# Patient Record
Sex: FEMALE | Race: WHITE | Employment: UNEMPLOYED | URBAN - METROPOLITAN AREA
[De-identification: names, ages, dates, MRNs, and addresses within clinical notes are randomized per-mention and may not be internally consistent; named-entity substitution may affect disease eponyms.]

---

## 2017-10-28 ENCOUNTER — HOSPITAL ENCOUNTER (INPATIENT)
Age: 37
LOS: 9 days | Discharge: HOME | DRG: 885 | End: 2017-11-06
Attending: PSYCHIATRY & NEUROLOGY | Admitting: PSYCHIATRY & NEUROLOGY
Payer: MEDICARE

## 2017-10-28 PROBLEM — F25.0 SCHIZOAFFECTIVE DISORDER, BIPOLAR TYPE (HCC): Status: ACTIVE | Noted: 2017-10-28

## 2017-10-28 PROBLEM — F25.9 SCHIZOAFFECTIVE DISORDER (HCC): Status: ACTIVE | Noted: 2017-10-28

## 2017-10-28 PROBLEM — F17.210 DEPENDENCE ON NICOTINE FROM CIGARETTES: Status: ACTIVE | Noted: 2017-10-28

## 2017-10-28 PROBLEM — F10.10 ALCOHOL ABUSE: Status: ACTIVE | Noted: 2017-10-28

## 2017-10-28 PROCEDURE — 74011250637 HC RX REV CODE- 250/637: Performed by: PSYCHIATRY & NEUROLOGY

## 2017-10-28 PROCEDURE — 65220000001 HC RM PRIVATE PSYCH

## 2017-10-28 RX ORDER — LORAZEPAM 1 MG/1
1 TABLET ORAL
Status: DISCONTINUED | OUTPATIENT
Start: 2017-10-28 | End: 2017-11-06 | Stop reason: HOSPADM

## 2017-10-28 RX ORDER — ACETAMINOPHEN 325 MG/1
650 TABLET ORAL
Status: DISCONTINUED | OUTPATIENT
Start: 2017-10-28 | End: 2017-11-06 | Stop reason: HOSPADM

## 2017-10-28 RX ORDER — DIVALPROEX SODIUM 500 MG/1
1000 TABLET, DELAYED RELEASE ORAL
Status: ON HOLD | COMMUNITY
End: 2017-10-30

## 2017-10-28 RX ORDER — HYDROCHLOROTHIAZIDE 25 MG/1
25 TABLET ORAL DAILY
Status: DISCONTINUED | OUTPATIENT
Start: 2017-10-29 | End: 2017-10-28

## 2017-10-28 RX ORDER — BENZTROPINE MESYLATE 2 MG/1
2 TABLET ORAL
Status: DISCONTINUED | OUTPATIENT
Start: 2017-10-28 | End: 2017-11-06 | Stop reason: HOSPADM

## 2017-10-28 RX ORDER — HALOPERIDOL 5 MG/ML
5 INJECTION INTRAMUSCULAR
Status: DISCONTINUED | OUTPATIENT
Start: 2017-10-28 | End: 2017-11-06 | Stop reason: HOSPADM

## 2017-10-28 RX ORDER — IBUPROFEN 200 MG
1 TABLET ORAL
Status: DISCONTINUED | OUTPATIENT
Start: 2017-10-28 | End: 2017-11-06 | Stop reason: HOSPADM

## 2017-10-28 RX ORDER — ZOLPIDEM TARTRATE 10 MG/1
10 TABLET ORAL
Status: DISCONTINUED | OUTPATIENT
Start: 2017-10-28 | End: 2017-11-06 | Stop reason: HOSPADM

## 2017-10-28 RX ORDER — HALOPERIDOL 5 MG/1
5 TABLET ORAL
Status: DISCONTINUED | OUTPATIENT
Start: 2017-10-28 | End: 2017-11-06 | Stop reason: HOSPADM

## 2017-10-28 RX ORDER — ADHESIVE BANDAGE
30 BANDAGE TOPICAL DAILY PRN
Status: DISCONTINUED | OUTPATIENT
Start: 2017-10-28 | End: 2017-11-06 | Stop reason: HOSPADM

## 2017-10-28 RX ORDER — BENZTROPINE MESYLATE 1 MG/1
0.5 TABLET ORAL DAILY
Status: DISCONTINUED | OUTPATIENT
Start: 2017-10-29 | End: 2017-11-06 | Stop reason: HOSPADM

## 2017-10-28 RX ORDER — HYDROCHLOROTHIAZIDE 25 MG/1
25 TABLET ORAL DAILY
COMMUNITY
End: 2017-11-06

## 2017-10-28 RX ORDER — BENZTROPINE MESYLATE 0.5 MG/1
0.5 TABLET ORAL 2 TIMES DAILY
COMMUNITY
End: 2017-11-06

## 2017-10-28 RX ORDER — LITHIUM CARBONATE 300 MG/1
300 TABLET, FILM COATED, EXTENDED RELEASE ORAL 2 TIMES DAILY
Status: DISCONTINUED | OUTPATIENT
Start: 2017-10-28 | End: 2017-11-01

## 2017-10-28 RX ORDER — BENZTROPINE MESYLATE 1 MG/ML
2 INJECTION INTRAMUSCULAR; INTRAVENOUS
Status: DISCONTINUED | OUTPATIENT
Start: 2017-10-28 | End: 2017-11-06 | Stop reason: HOSPADM

## 2017-10-28 RX ORDER — IBUPROFEN 400 MG/1
400 TABLET ORAL
Status: DISCONTINUED | OUTPATIENT
Start: 2017-10-28 | End: 2017-10-30

## 2017-10-28 RX ORDER — ALBUTEROL SULFATE 90 UG/1
AEROSOL, METERED RESPIRATORY (INHALATION)
Status: ON HOLD | COMMUNITY
End: 2017-10-30

## 2017-10-28 RX ORDER — OLANZAPINE 5 MG/1
5 TABLET ORAL
Status: DISCONTINUED | OUTPATIENT
Start: 2017-10-28 | End: 2017-10-28

## 2017-10-28 RX ORDER — LORAZEPAM 2 MG/ML
2 INJECTION INTRAMUSCULAR
Status: DISCONTINUED | OUTPATIENT
Start: 2017-10-28 | End: 2017-11-06 | Stop reason: HOSPADM

## 2017-10-28 RX ORDER — ALBUTEROL SULFATE 90 UG/1
2 AEROSOL, METERED RESPIRATORY (INHALATION)
Status: DISCONTINUED | OUTPATIENT
Start: 2017-10-28 | End: 2017-10-31

## 2017-10-28 RX ORDER — BISMUTH SUBSALICYLATE 262 MG
1 TABLET,CHEWABLE ORAL DAILY
Status: ON HOLD | COMMUNITY
End: 2017-10-30

## 2017-10-28 RX ADMIN — LITHIUM CARBONATE 300 MG: 300 TABLET, FILM COATED, EXTENDED RELEASE ORAL at 12:28

## 2017-10-28 RX ADMIN — ALBUTEROL SULFATE 2 PUFF: 90 AEROSOL, METERED RESPIRATORY (INHALATION) at 09:34

## 2017-10-28 RX ADMIN — LITHIUM CARBONATE 300 MG: 300 TABLET, FILM COATED, EXTENDED RELEASE ORAL at 21:50

## 2017-10-28 NOTE — BH NOTES
Behavior  The patient Catherine Gonsalez is alert and oriented.  Her hygiene and nutritional intake has remain adequate.  Her  behavior is appropriate in the milieu with peers and staff, but presents Anxious . Patient contracts for safety.  Pt medication compliant. Per medication nurse. Patient exhibiting A wide arrange affect. Mood, hyper with Flight of ideals.         Intervention  1:1 interaction to assess mood and thought process. Staff offering assistance as needed.     Response  Pt denies S/H ideations. Pt denies hearing voices At this  times. Pt attending groups. Plan  Plan is to assess mood and thought process Staff encouraging verbalization of issues and feelings in the Appropriate manner Staff will monitor for changes.  Q 15 minute checks continue.

## 2017-10-28 NOTE — BH NOTES
ADMISSION NOTE:    Pt is a 41 yo female here on temporary intermediate order due to manic presentation, drunkeness, destructive behavior, substance abuse impulsive, agitated. PT is on probation for  drunkeness in public. Pt spent last night in [facility]. BAL and UDS negative. She has h/o HTN, asthma. PT stated \"I was receiving help from [id number] and they sent me here for medication adjustment. \"  She denied SI/HI and a/v hallucinations. Her skin assessment showed tattoo on the right and left shoulders and the right forearm. [facility] Linda Go

## 2017-10-28 NOTE — H&P
INITIAL PSYCHIATRIC EVALUATION            IDENTIFICATION:    Patient Name  Ericka Candelaria   Date of Birth 1980   [facility] [id number]   Medical Record Number  [id number]      Age  40 y.o. PCP Arturo Verdin MD   Admit date:  10/28/2017    Room Number  022/59  @ [facility]   Date of Service  10/28/2017            HISTORY         REASON FOR HOSPITALIZATION:  CC: \"feeling okay, I painted my room with Ronaldo name\". Pt admitted under a temporary residential order (TDO) for severe psychosis and rafaela proving to be an imminent danger to self and others and an inability to care for self. HISTORY OF PRESENT ILLNESS:    The patient, Ericka Candelaria, is a 40 y.o. WHITE [place]  female with a past psychiatric history significant for schizoaffective disorder, who presents at this time with complaints of (and/or evidence of) the following emotional symptoms: psychotic behavior. Additional symptomatology include impulsive, bizarre behavior ( tore up her room, painted her room with Ronaldo and red white and blue color). She is also reported to have been responding to internal stimuli- \"yelling at god\", alcohol abuse  and anger outbursts. On admission, pt is calm but appear guarded, paranoid delusional themes and with flat affect. Denies SI/HI. The above symptoms have been present for 1 week. These symptoms are of severe severity. These symptoms are constant in nature. The patient's condition has been precipitated by and psychosocial stressors (alcohol abuse, non compliance ). Patient's condition made worse by  alcohol use as well as treatment noncompliance. UDS: negative; BAL=0. ALLERGIES: No Known Allergies   MEDICATIONS PRIOR TO ADMISSION:   Prescriptions Prior to Admission   Medication Sig    ARIPiprazole (ABILIFY) 9.75 mg/1.3 mL injection by IntraMUSCular route daily.     white petrolatum-mineral oil (REFRESH LACRI-LUBE) 56.8-42.5 % ointment Administer  to both eyes every twelve (12) hours.  HALOPERIDOL DECANOATE IM by IntraMUSCular route.  HYPROMELLOSE (TEARISOL OP) Apply  to eye.  hydroCHLOROthiazide (HYDRODIURIL) 25 mg tablet Take 25 mg by mouth daily.  divalproex DR (DEPAKOTE) 500 mg tablet Take 1,000 mg by mouth nightly.  multivitamin (ONE A DAY) tablet Take 1 Tab by mouth daily.  albuterol (PROVENTIL HFA, VENTOLIN HFA, PROAIR HFA) 90 mcg/actuation inhaler Take  by inhalation.  benztropine (COGENTIN) 0.5 mg tablet Take 0.5 mg by mouth daily. PAST MEDICAL HISTORY:   Past Medical History:   Diagnosis Date    Hypertension    No past surgical history on file. SOCIAL HISTORY:    Social History     Social History    Marital status: SINGLE     Spouse name: N/A    Number of children: N/A    Years of education: N/A     Occupational History    Not on file. Social History Main Topics    Smoking status: Current Every Day Smoker    Smokeless tobacco: Never Used    Alcohol use Yes      Comment: binge drinking- has sig use in the past    Drug use: Yes     Special: Marijuana    Sexual activity: Yes     Other Topics Concern    Not on file     Social History Narrative    Broke up with her BF of 2 years. Single. Lives with her stepfather. Drunken in public offense-    No sexual abuse          FAMILY HISTORY: History reviewed. No pertinent family history. No family history on file. REVIEW OF SYSTEMS:   Psychological ROS: positive for - behavioral disorder, hallucinations, irritability and delusion  Pertinent items are noted in the History of Present Illness. All other Systems reviewed and are considered negative.            MENTAL STATUS EXAM & VITALS     MENTAL STATUS EXAM (MSE):    MSE FINDINGS ARE WITHIN NORMAL LIMITS (WNL) UNLESS OTHERWISE STATED BELOW. ( ALL OF THE BELOW CATEGORIES OF THE MSE HAVE BEEN REVIEWED (reviewed 10/28/2017) AND UPDATED AS DEEMED APPROPRIATE )  General Presentation age appropriate and casually dressed, guarded and unreliable   Orientation disorganized   Vital Signs  See below (reviewed 10/28/2017); Vital Signs (BP, Pulse, & Temp) are within normal limits if not listed below. Gait and Station Stable/steady, no ataxia   Musculoskeletal System No extrapyramidal symptoms (EPS); no abnormal muscular movements or Tardive Dyskinesia (TD); muscle strength and tone are within normal limits   Language No aphasia or dysarthria   Speech:  hypoverbal and monotone   Thought Processes illogical; slow rate of thoughts; poor abstract reasoning/computation   Thought Associations tangential   Thought Content paranoid delusions, bizarre delusions, auditory hallucinations and internally preoccupied   Suicidal Ideations none and contracts for safety   Homicidal Ideations none   Mood:  anxious  and anhedonia   Affect:  anxious, inappropriate, increased in intensity and mood-incongruent   Memory recent  impaired   Memory remote:  intact   Concentration/Attention:  distractable and hypervigilance   Fund of Knowledge average   Insight:  poor   Reliability poor   Judgment:  limited          VITALS:     Patient Vitals for the past 24 hrs:   Temp Pulse Resp BP SpO2   10/28/17 1147 97 °F (36.1 °C) - 16 133/87 -   10/28/17 0545 95.1 °F (35.1 °C) 77 18 115/82 96 %     Wt Readings from Last 3 Encounters:   10/28/17 125.2 kg (276 lb)     Temp Readings from Last 3 Encounters:   10/28/17 97 °F (36.1 °C)     BP Readings from Last 3 Encounters:   10/28/17 133/87     Pulse Readings from Last 3 Encounters:   10/28/17 77            DATA     LABORATORY DATA:  Labs Reviewed - No data to display  No results found for any previous visit. RADIOLOGY REPORTS:  No results found for this or any previous visit. No results found.            MEDICATIONS       ALL MEDICATIONS  Current Facility-Administered Medications   Medication Dose Route Frequency    benztropine (COGENTIN) tablet 2 mg  2 mg Oral BID PRN    benztropine (COGENTIN) injection 2 mg  2 mg IntraMUSCular BID PRN    LORazepam (ATIVAN) injection 2 mg  2 mg IntraMUSCular Q4H PRN    LORazepam (ATIVAN) tablet 1 mg  1 mg Oral Q4H PRN    zolpidem (AMBIEN) tablet 10 mg  10 mg Oral QHS PRN    acetaminophen (TYLENOL) tablet 650 mg  650 mg Oral Q4H PRN    ibuprofen (MOTRIN) tablet 400 mg  400 mg Oral Q8H PRN    magnesium hydroxide (MILK OF MAGNESIA) 400 mg/5 mL oral suspension 30 mL  30 mL Oral DAILY PRN    nicotine (NICODERM CQ) 21 mg/24 hr patch 1 Patch  1 Patch TransDERmal DAILY PRN    albuterol (PROVENTIL HFA, VENTOLIN HFA, PROAIR HFA) inhaler 2 Puff  2 Puff Inhalation Q4H RT    [START ON 10/29/2017] benztropine (COGENTIN) tablet 0.5 mg  0.5 mg Oral DAILY    haloperidol (HALDOL) tablet 5 mg  5 mg Oral Q6H PRN    haloperidol lactate (HALDOL) injection 5 mg  5 mg IntraMUSCular Q6H PRN    lithium carbonate SR (LITHOBID) tablet 300 mg  300 mg Oral BID      SCHEDULED MEDICATIONS  Current Facility-Administered Medications   Medication Dose Route Frequency    albuterol (PROVENTIL HFA, VENTOLIN HFA, PROAIR HFA) inhaler 2 Puff  2 Puff Inhalation Q4H RT    [START ON 10/29/2017] benztropine (COGENTIN) tablet 0.5 mg  0.5 mg Oral DAILY    lithium carbonate SR (LITHOBID) tablet 300 mg  300 mg Oral BID                ASSESSMENT & PLAN        The patient, Catherine Gonsalez, is a 40 y.o.  female who presents at this time for treatment of the following diagnoses:  Patient Active Hospital Problem List:   Schizoaffective disorder, bipolar type ([facility] [address] (10/28/2017)    Assessment: acute on chronic- psychosis with agitation- calm on admission- f/u with local [facility]- states she received Haldol dec yesterday,? Abilify depot and not taking her Depakote    Plan: I will ct to adjust med- need collateral info- ct with Haldol Dec and add PO, switch Depakote to Lithium   Alcohol abuse (10/28/2017)    Assessment: episodic per report, denies w/d sx    Plan: ct to monitor for w/d- BAL-ve   Dependence on nicotine from cigarettes (10/28/2017) Assessment: chronic    Plan: nicotine patch          I will continue to monitor blood levels (Depakote, lithium, -a drug with a narrow therapeutic index= NTI) and associated labs for drug therapy implemented that require intense monitoring for toxicity as deemed appropriate based on current medication side effects and pharmacodynamically determined drug 1/2 lives. A coordinated, multidisplinary treatment team (includes the nurse, unit pharmcist,  and writer) round was conducted for this initial evaluation with the patient present. The following regarding medications was addressed during rounds with patient:   the risks and benefits of the proposed medication. The patient was given the opportunity to ask questions. Informed consent given to the use of the above medications. I will continue to adjust psychiatric and non-psychiatric medications (see above \"medication\" section and orders section for details) as deemed appropriate & based upon diagnoses and response to treatment. I have reviewed admission (and previous/old) labs and medical tests in the EHR and or transferring hospital documents. I will continue to order blood tests/labs and diagnostic tests as deemed appropriate and review results as they become available (see orders for details). I have reviewed old psychiatric and medical records available in the EHR. I Will order additional psychiatric records from other institutions to further elucidate the nature of patient's psychopathology and review once available. I will gather additional collateral information from friends, family and o/p treatment team to further elucidate the nature of patient's psychopathology and baselline level of psychiatric functioning.       ESTIMATED LENGTH OF STAY:    TBD       STRENGTHS:  Access to housing/residential stability and Interpersonal/supportive relationships (family, friends, peers)                                        SIGNED: Adin Ho MD  10/28/2017

## 2017-10-28 NOTE — IP AVS SNAPSHOT
[facility] 
 
 
 [address] Streeter Patient: Estela Ortiz MRN: [id number] OHM:[id number] About your hospitalization You were admitted on:  October 28, 2017 You last received care in the:  [facility]. [facility] You were discharged on:  November 6, 2017 Why you were hospitalized Your primary diagnosis was:  Schizoaffective Disorder, Bipolar Type (Hcc) Your diagnoses also included:  Alcohol Abuse, Dependence On Nicotine From Cigarettes Things You Need To Do (next 8 weeks) Follow up with Rmaonita Lewis MD  
  
Phone:  [phone] Where:  [facility] Tuesday Nov 14, 2017 Go to [facility]  
psychiatric medication management at 8:45 AM  
  
Where:  Address: [facility], [address] [place] [address] Phone: [phone] Friday Nov 24, 2017 Follow up with Haloperidol decanoate 150 mg IM injection Haloperidol decanoate 150 mg IM injection due on 11/24/17. Please discontinue Abilify Maintena injection. Where:  [facility] Board Discharge Orders None A check [place] indicates which time of day the medication should be taken. My Medications STOP taking these medications ABILIFY MAINTENA 400 mg injection Generic drug:  ARIPiprazole  
   
  
 hydroCHLOROthiazide 25 mg tablet Commonly known as:  HYDRODIURIL  
   
  
 lisinopril 20 mg tablet Commonly known as:  PRINIVIL, ZESTRIL  
   
  
  
TAKE these medications as instructed Instructions Each Dose to Equal  
 Morning Noon Evening Bedtime  
 atenolol 25 mg tablet Commonly known as:  TENORMIN Start taking on:  11/7/2017 Your last dose was: Your next dose is: Take 1 Tab by mouth daily for 14 days. Indications: hypertension 25 mg  
    
   
   
   
  
 benztropine 0.5 mg tablet Commonly known as:  COGENTIN Start taking on:  11/7/2017 Your last dose was: Your next dose is: Take 1 Tab by mouth daily for 14 days. Indications: extrapyramidal disease 0.5 mg  
    
   
   
   
  
 haloperidol 5 mg tablet Commonly known as:  HALDOL Your last dose was: Your next dose is: Take 1 Tab by mouth two (2) times a day for 14 days. Indications: Schizoaffective disorder 5 mg  
    
   
   
   
  
 haloperidol decanoate 100 mg/mL injection Commonly known as:  HALDOL DECANOATE Start taking on:  11/24/2017 Your last dose was: Your next dose is:    
   
   
 1.5 mL by IntraMUSCular route every twenty-eight (28) days for 28 days. Indications: Bipolar disorder 150 mg  
    
   
   
   
  
 * lithium carbonate  mg CR tablet Commonly known as:  Marta Killings Your last dose was: Your next dose is: Take 2 Tabs by mouth nightly for 14 days. Indications: Schizoaffective disorder, bipolar type 600 mg  
    
   
   
   
  
 * lithium carbonate  mg CR tablet Commonly known as:  Marta Killings Start taking on:  11/7/2017 Your last dose was: Your next dose is: Take 1 Tab by mouth daily for 14 days. Indications: Schizoaffective disorder, bipolar type 300 mg * Notice: This list has 2 medication(s) that are the same as other medications prescribed for you. Read the directions carefully, and ask your doctor or other care provider to review them with you. Where to Get Your Medications Information on where to get these meds will be given to you by the nurse or doctor. ! Ask your nurse or doctor about these medications  
  atenolol 25 mg tablet  
 benztropine 0.5 mg tablet  
 haloperidol 5 mg tablet  
 haloperidol decanoate 100 mg/mL injection  
 lithium carbonate  mg CR tablet  
 lithium carbonate  mg CR tablet Discharge Instructions Maritza Tobias  [phone] [address] [address] [phone] Arsenio Preston  [phone] "[employer]" 32-   [phone] [facility]-  [phone] [address] [facility]  [phone] [address] [facility]  [phone] [employer] Announcement We are excited to announce that we are making your provider's discharge notes available to you in [employer]. You will see these notes when they are completed and signed by the physician that discharged you from your recent hospital stay. If you have any questions or concerns about any information you see in [employer], please call the Health Information Department where you were seen or reach out to your Primary Care Provider for more information about your plan of care. Introducing [facility]! [address] [facility] introduces [employer] patient portal. Now you can access parts of your medical record, email your doctor's office, and request medication refills online. 1. In your internet browser, go to [email or web address] 2. Click on the First Time User? Click Here link in the Sign In box. You will see the New Member Sign Up page. 3. Enter your [employer] Access Code exactly as it appears below. You will not need to use this code after youve completed the sign-up process. If you do not sign up before the expiration date, you must request a new code. · [employer] Access Code: Melissa Kothari Expires: 2/4/2018 11:16 AM 
 
4. Enter the last four digits of your Social Security Number (xxxx) and Date of Birth (mm/dd/yyyy) as indicated and click Submit. You will be taken to the next sign-up page. 5. Create a Secret Labt ID. This will be your [employer] login ID and cannot be changed, so think of one that is secure and easy to remember. 6. Create a Secret Labt password. You can change your password at any time. 7. Enter your Password Reset Question and Answer. This can be used at a later time if you forget your password. 8. Enter your e-mail address. You will receive e-mail notification when new information is available in [address] 9. Click Sign Up. You can now view and download portions of your medical record. 10. Click the Download Summary menu link to download a portable copy of your medical information. If you have questions, please visit the Frequently Asked Questions section of the [employer] website. Remember, [employer] is NOT to be used for urgent needs. For medical emergencies, dial 911. Now available from your iPhone and Android! Providers Seen During Your Hospitalization Provider Specialty Primary office phone Eric Singh MD Psychiatry [phone] Your Primary Care Physician (PCP) Primary Care Physician Office Phone Office Fax [place] [phone] You are allergic to the following No active allergies Recent Documentation Height Weight Breastfeeding? BMI Smoking Status 1.651 m 125.2 kg No 45.93 kg/m2 Current Every Day Smoker Emergency Contacts Name Discharge Info Relation Home Work Mobile Jorge A Hauser CAREGIVER [3] Friend [5] [phone] Teodoro Amarilys CAREGIVER [3] Friend [5] [phone] Patient Belongings The following personal items are in your possession at time of discharge: 
  Dental Appliances: None  Visual Aid: None      Home Medications: None   Jewelry: Earrings  Clothing: Footwear, Pants, Shirt    Other Valuables: Money (comment) (One Dollar)  Personal Items Sent to Safe: One dollar Please provide this summary of care documentation to your next provider. Signatures-by signing, you are acknowledging that this After Visit Summary has been reviewed with you and you have received a copy. Patient Signature:  ____________________________________________________________ Date:  ____________________________________________________________  
  
Orbie Green Provider Signature:  ____________________________________________________________ Date:  ____________________________________________________________

## 2017-10-28 NOTE — BH NOTES
GROUP THERAPY PROGRESS NOTE    The patient Catherine Gonsalez is participating in [employer]. Group time: 30 minutes    Personal goal for participation: to orient the patient to the unit.     Goal orientation: successful adoption of unit rules    Group therapy participation: minimal    Therapeutic interventions reviewed and discussed: Yes    Impression of participation: fiona Thornton  10/28/2017 2:06 PM

## 2017-10-29 PROCEDURE — 65220000001 HC RM PRIVATE PSYCH

## 2017-10-29 PROCEDURE — 74011250637 HC RX REV CODE- 250/637: Performed by: PSYCHIATRY & NEUROLOGY

## 2017-10-29 RX ORDER — HALOPERIDOL 5 MG/1
5 TABLET ORAL 2 TIMES DAILY
Status: DISCONTINUED | OUTPATIENT
Start: 2017-10-29 | End: 2017-11-06 | Stop reason: HOSPADM

## 2017-10-29 RX ORDER — ATENOLOL 25 MG/1
25 TABLET ORAL DAILY
Status: DISCONTINUED | OUTPATIENT
Start: 2017-10-30 | End: 2017-11-06 | Stop reason: HOSPADM

## 2017-10-29 RX ADMIN — LITHIUM CARBONATE 300 MG: 300 TABLET, FILM COATED, EXTENDED RELEASE ORAL at 08:10

## 2017-10-29 RX ADMIN — ALBUTEROL SULFATE 2 PUFF: 90 AEROSOL, METERED RESPIRATORY (INHALATION) at 08:09

## 2017-10-29 RX ADMIN — BENZTROPINE MESYLATE 0.5 MG: 1 TABLET ORAL at 08:11

## 2017-10-29 RX ADMIN — ALBUTEROL SULFATE 2 PUFF: 90 AEROSOL, METERED RESPIRATORY (INHALATION) at 12:01

## 2017-10-29 NOTE — BH NOTES
PSYCHIATRIC PROGRESS NOTE         Patient Name  Josemanuel Silver   Date of Birth 1980   [facility] [id number]   Medical Record Number  [id number]      Age  40 y.o. PCP Arthur Elmore MD   Admit date:  10/28/2017    Room Number  451/50  @ [facility]   Date of Service  10/29/2017          PSYCHOTHERAPY SESSION NOTE:  Length of psychotherapy session: 15 minutes    Main condition/diagnosis/issues treated during session today, 10/29/2017 : psychosis, agitation, medication, tx compliance    I employed Cognitive Behavioral therapy techniques, Reality-Oriented psychotherapy, as well as supportive psychotherapy in regards to various ongoing psychosocial stressors, including the following: pre-admission and current problems; housing issues; occupational issues; academic issues; medical issues; and stress of hospitalization. Interpersonal relationship issues and psychodynamic conflicts explored. Attempts made to alleviate maladaptive patterns. We, also, worked on issues of denial & effects of substance dependency/use     Overall, patient is not progressing    Treatment Plan Update (reviewed an updated 10/29/2017) : I will modify psychotherapy tx plan by implementing more stress management strategies, building upon cognitive behavioral techniques, increasing coping skills, as well as shoring up psychological defenses). An extended energy and skill set was needed to engage pt in psychotherapy due to some of the following: resistiveness, complexity, negativity, confrontational nature, hostile behaviors, and/or severe abnormalities in thought processes/psychosis resulting in the loss of expressive/receptive language communication skills. E & M PROGRESS NOTE:         HISTORY       CC:  \"okay\"  HISTORY OF PRESENT ILLNESS/INTERVAL HISTORY:  (reviewed/updated 10/29/2017). per initial evaluation: The patient, Josemanuel Silver, is a 40 y.o.   WHITE [place]  female with a past psychiatric history significant for schizoaffective disorder, who presents at this time with complaints of (and/or evidence of) the following emotional symptoms: psychotic behavior. Additional symptomatology include impulsive, bizarre behavior ( tore up her room, painted her room with Ronaldo and red white and blue color). She is also reported to have been responding to internal stimuli- \"yelling at god\", alcohol abuse  and anger outbursts. On admission, pt is calm but appear guarded, paranoid delusional themes and with flat affect. Denies SI/HI. The above symptoms have been present for 1 week. These symptoms are of severe severity. These symptoms are constant in nature. The patient's condition has been precipitated by and psychosocial stressors (alcohol abuse, non compliance ). Patient's condition made worse by  alcohol use as well as treatment noncompliance. UDS: negative; BAL=0. [employer] presents/reports/evidences the following emotional symptoms today, 10/29/2017:psychotic behavior. The above symptoms have been present for 1 week. These symptoms are of severe severity. The symptoms are constant in nature. Additional symptomatology and features include labile mood, guarded with paranoid delusional themes. Refused labs. SIDE EFFECTS: (reviewed/updated 10/29/2017)  None reported or admitted to. No noted toxicity with use of current med   ALLERGIES:(reviewed/updated 10/29/2017)  No Known Allergies   MEDICATIONS PRIOR TO ADMISSION:(reviewed/updated 10/29/2017)  Prescriptions Prior to Admission   Medication Sig    ARIPiprazole (ABILIFY) 9.75 mg/1.3 mL injection by IntraMUSCular route daily.  white petrolatum-mineral oil (REFRESH LACRI-LUBE) 56.8-42.5 % ointment Administer  to both eyes every twelve (12) hours.  HALOPERIDOL DECANOATE IM by IntraMUSCular route.  HYPROMELLOSE (TEARISOL OP) Apply  to eye.  hydroCHLOROthiazide (HYDRODIURIL) 25 mg tablet Take 25 mg by mouth daily.  divalproex DR (DEPAKOTE) 500 mg tablet Take 1,000 mg by mouth nightly.  multivitamin (ONE A DAY) tablet Take 1 Tab by mouth daily.  albuterol (PROVENTIL HFA, VENTOLIN HFA, PROAIR HFA) 90 mcg/actuation inhaler Take  by inhalation every four (4) hours as needed.  benztropine (COGENTIN) 0.5 mg tablet Take 0.5 mg by mouth daily. PAST MEDICAL HISTORY: Past medical history from the initial psychiatric evaluation has been reviewed (reviewed/updated 10/29/2017) with no additional updates (I asked patient and no additional past medical history provided). Past Medical History:   Diagnosis Date    Hypertension    No past surgical history on file. SOCIAL HISTORY: Social history from the initial psychiatric evaluation has been reviewed (reviewed/updated 10/29/2017) with no additional updates (I asked patient and no additional social history provided). Social History     Social History    Marital status: SINGLE     Spouse name: N/A    Number of children: N/A    Years of education: N/A     Occupational History    Not on file. Social History Main Topics    Smoking status: Current Every Day Smoker    Smokeless tobacco: Never Used    Alcohol use Yes      Comment: binge drinking- has sig use in the past    Drug use: Yes     Special: Marijuana    Sexual activity: Yes     Other Topics Concern    Not on file     Social History Narrative    Broke up with her BF of 2 years. Single. Lives with her stepfather. Drunken in public offense-    No sexual abuse          FAMILY HISTORY: Family history from the initial psychiatric evaluation has been reviewed (reviewed/updated 10/29/2017) with no additional updates (I asked patient and no additional family history provided). No family history on file.     REVIEW OF SYSTEMS: (reviewed/updated 10/29/2017)  Appetite:good   Sleep: fitful   All other Review of Systems: Psychological ROS: positive for - behavioral disorder, concentration difficulties, irritability, mood swings and psychosis  Respiratory ROS: no cough, shortness of breath, or wheezing  Cardiovascular ROS: no chest pain or dyspnea on exertion  Neurological ROS: no TIA or stroke symptoms         MENTAL STATUS EXAM & VITALS     MENTAL STATUS EXAM (MSE):    MSE FINDINGS ARE WITHIN NORMAL LIMITS (WNL) UNLESS OTHERWISE STATED BELOW. ( ALL OF THE BELOW CATEGORIES OF THE MSE HAVE BEEN REVIEWED (reviewed 10/29/2017) AND UPDATED AS DEEMED APPROPRIATE )  General Presentation age appropriate and disheveled, guarded, passive and unreliable   Orientation oriented to time, place and person   Vital Signs  See below (reviewed 10/29/2017); Vital Signs (BP, Pulse, & Temp) are within normal limits if not listed below.    Gait and Station Stable/steady, no ataxia   Musculoskeletal System No extrapyramidal symptoms (EPS); no abnormal muscular movements or Tardive Dyskinesia (TD); muscle strength and tone are within normal limits   Language No aphasia or dysarthria   Speech:  monotone   Thought Processes illogical; slow rate of thoughts; poor abstract reasoning/computation   Thought Associations blocked    Thought Content paranoid delusions, free of hallucinations and internally preoccupied   Suicidal Ideations none   Homicidal Ideations none   Mood:  irritable and labile    Affect:  increased in intensity, irritable and labile   Memory recent  impaired   Memory remote:  intact   Concentration/Attention:  distractable   Fund of Knowledge average   Insight:  limited   Reliability poor   Judgment:  limited          VITALS:     Patient Vitals for the past 24 hrs:   Temp Pulse Resp BP   10/29/17 1600 96.5 °F (35.8 °C) (!) 105 18 (!) 129/98     Wt Readings from Last 3 Encounters:   10/28/17 125.2 kg (276 lb)     Temp Readings from Last 3 Encounters:   10/29/17 96.5 °F (35.8 °C)     BP Readings from Last 3 Encounters:   10/29/17 (!) 129/98     Pulse Readings from Last 3 Encounters:   10/29/17 (!) 105            DATA LABORATORY DATA:(reviewed/updated 10/29/2017)  No results found for this or any previous visit (from the past 24 hour(s)). No results found for: VALF2, VALAC, VALP, VALPR, DS6, CRBAM, CRBAMP, CARB2, XCRBAM  No results found for: LITHM   RADIOLOGY REPORTS:(reviewed/updated 10/29/2017)  No results found.        MEDICATIONS     ALL MEDICATIONS:   Current Facility-Administered Medications   Medication Dose Route Frequency    [START ON 10/30/2017] atenolol (TENORMIN) tablet 25 mg  25 mg Oral DAILY    haloperidol (HALDOL) tablet 5 mg  5 mg Oral BID    benztropine (COGENTIN) tablet 2 mg  2 mg Oral BID PRN    benztropine (COGENTIN) injection 2 mg  2 mg IntraMUSCular BID PRN    LORazepam (ATIVAN) injection 2 mg  2 mg IntraMUSCular Q4H PRN    LORazepam (ATIVAN) tablet 1 mg  1 mg Oral Q4H PRN    zolpidem (AMBIEN) tablet 10 mg  10 mg Oral QHS PRN    acetaminophen (TYLENOL) tablet 650 mg  650 mg Oral Q4H PRN    ibuprofen (MOTRIN) tablet 400 mg  400 mg Oral Q8H PRN    magnesium hydroxide (MILK OF MAGNESIA) 400 mg/5 mL oral suspension 30 mL  30 mL Oral DAILY PRN    nicotine (NICODERM CQ) 21 mg/24 hr patch 1 Patch  1 Patch TransDERmal DAILY PRN    albuterol (PROVENTIL HFA, VENTOLIN HFA, PROAIR HFA) inhaler 2 Puff  2 Puff Inhalation Q4H RT    benztropine (COGENTIN) tablet 0.5 mg  0.5 mg Oral DAILY    haloperidol (HALDOL) tablet 5 mg  5 mg Oral Q6H PRN    haloperidol lactate (HALDOL) injection 5 mg  5 mg IntraMUSCular Q6H PRN    lithium carbonate SR (LITHOBID) tablet 300 mg  300 mg Oral BID      SCHEDULED MEDICATIONS:   Current Facility-Administered Medications   Medication Dose Route Frequency    [START ON 10/30/2017] atenolol (TENORMIN) tablet 25 mg  25 mg Oral DAILY    haloperidol (HALDOL) tablet 5 mg  5 mg Oral BID    albuterol (PROVENTIL HFA, VENTOLIN HFA, PROAIR HFA) inhaler 2 Puff  2 Puff Inhalation Q4H RT    benztropine (COGENTIN) tablet 0.5 mg  0.5 mg Oral DAILY    lithium carbonate SR (LITHOBID) tablet 300 mg  300 mg Oral BID          ASSESSMENT & PLAN     DIAGNOSES REQUIRING ACTIVE TREATMENT AND MONITORING: (reviewed/updated 10/29/2017)  Patient Active Hospital Problem List:   Schizoaffective disorder, bipolar type ([facility] [address] (10/28/2017)    Assessment: acute on chronic- psychosis with agitation- calm on admission- f/u with local [facility]- states she received Haldol dec yesterday,? Abilify depot and not taking her Depakote- minimal change    Plan: I will ct to adjust med- need collateral info- ct with Haldol Dec and add PO, switch Depakote to Lithium   Alcohol abuse (10/28/2017)    Assessment: episodic per report, denies w/d sx    Plan: ct to monitor for w/d- BAL-ve   Dependence on nicotine from cigarettes (10/28/2017)    Assessment: chronic    Plan: nicotine patch      I will continue to monitor blood levels (Depakote, lithium--a drug with a narrow therapeutic index= NTI) and associated labs for drug therapy implemented that require intense monitoring for toxicity as deemed appropriate based on current medication side effects and pharmacodynamically determined drug 1/2 lives. In summary, Stacie Ernst, is a 40 y.o.  female who presents with a severe exacerbation of the principal diagnosis of Schizoaffective disorder, bipolar type ([facility]  Patient's condition is worsening/not improving/not stable . Patient requires continued inpatient hospitalization for further stabilization, safety monitoring and medication management. I will continue to coordinate the provision of individual, milieu, occupational, group, and substance abuse therapies to address target symptoms/diagnoses as deemed appropriate for the individual patient. A coordinated, multidisplinary treatment team round was conducted with the patient (this team consists of the nurse, psychiatric unit pharmcist,  and writer).      Complete current electronic health record for patient has been reviewed today including consultant notes, ancillary staff notes, nurses and psychiatric tech notes. Suicide risk assessment completed and patient deemed to be of low risk for suicide at this time. The following regarding medications was addressed during rounds with patient:   the risks and benefits of the proposed medication. The patient was given the opportunity to ask questions. Informed consent given to the use of the above medications. Will continue to adjust psychiatric and non-psychiatric medications (see above \"medication\" section and orders section for details) as deemed appropriate & based upon diagnoses and response to treatment. I will continue to order blood tests/labs and diagnostic tests as deemed appropriate and review results as they become available (see orders for details and above listed lab/test results). I will order psychiatric records from previous [facility] hospitals to further elucidate the nature of patient's psychopathology and review once available. I will gather additional collateral information from friends, family and o/p treatment team to further elucidate the nature of patient's psychopathology and baselline level of psychiatric functioning. I certify that this patient's inpatient psychiatric hospital services furnished since the previous certification were, and continue to be, required for treatment that could reasonably be expected to improve the patient's condition, or for diagnostic study, and that the patient continues to need, on a daily basis, active treatment furnished directly by or requiring the supervision of inpatient psychiatric facility personnel. In addition, the hospital records show that services furnished were intensive treatment services, admission or related services, or equivalent services.     EXPECTED DISCHARGE DATE/DAY: TBD     DISPOSITION: Home       Signed By:   Abimbola Cardenas MD  10/29/2017

## 2017-10-29 NOTE — BH NOTES
Alert and oriented and to all spheres. Pt.'s self care skills and grooming habits are fair; casually dressed. Pt.'s nutritional intake is adequate. During the shift this pt. have been mostly isolative to self in own room. Visible in the milieu to have needs met. Pt.'s insight and judgement regarding mental illness and judgement is limited. Presents as being guarded, anxious, and preoccupied. Affect/Mood: Blunt. Encourages verbalization of feelings. Offers support and anticipates needs. Q-15 minute checks maintained per Hospital protocol.

## 2017-10-29 NOTE — H&P
History and Physical    Subjective:     Erika Abreu is a 40 y.o. female with past medical hx significant for HTN, Asthma, and obesity. Pt is hospitalized with principal diagnosis of schizoaffective disorder. Pt is stable with regards to asthma not requiring any treatment. Pt is not copmplaining of any other acute medical issues. Pt is showing no signs of acute distress. Past Medical History:   Diagnosis Date    Hypertension      > Asthma   > Obesity     PSHx:non declared by pt. Fhx: cancer    Social History   Substance Use Topics    Smoking status: Current Every Day Smoker    Smokeless tobacco: Never Used    Alcohol use Yes      Comment: binge drinking- has sig use in the past       Prior to Admission medications    Medication Sig Start Date End Date Taking? Authorizing Provider   ARIPiprazole (ABILIFY) 9.75 mg/1.3 mL injection by IntraMUSCular route daily. Yes Historical Provider   white petrolatum-mineral oil (REFRESH LACRI-LUBE) 56.8-42.5 % ointment Administer  to both eyes every twelve (12) hours. Yes Historical Provider   HALOPERIDOL DECANOATE IM by IntraMUSCular route. Yes Historical Provider   HYPROMELLOSE (TEARISOL OP) Apply  to eye. Yes Historical Provider   hydroCHLOROthiazide (HYDRODIURIL) 25 mg tablet Take 25 mg by mouth daily. Yes Historical Provider   divalproex DR (DEPAKOTE) 500 mg tablet Take 1,000 mg by mouth nightly. Yes Historical Provider   multivitamin (ONE A DAY) tablet Take 1 Tab by mouth daily. Yes Historical Provider   albuterol (PROVENTIL HFA, VENTOLIN HFA, PROAIR HFA) 90 mcg/actuation inhaler Take  by inhalation. Yes Historical Provider   benztropine (COGENTIN) 0.5 mg tablet Take 0.5 mg by mouth daily.    Yes Historical Provider     No Known Allergies     Review of Systems:  Constitutional: negative  Eyes: negative  Ears, Nose, Mouth, Throat, and Face: negative  Respiratory: negative  Cardiovascular: negative  Gastrointestinal: negative  Genitourinary:negative  Integument/Breast: negative  Hematologic/Lymphatic: negative  Musculoskeletal:negative  Neurological: negative  Behavioral/Psychiatric: schizoaffective disorder  Endocrine: negative  Allergic/Immunologic: negative     Objective: Intake and Output:            Physical Exam:   Visit Vitals    /87 (BP 1 Location: Right arm, BP Patient Position: During activity)    Pulse 77    Temp 97 °F (36.1 °C)    Resp 16    Ht 5' 5\" (1.651 m)    Wt 125.2 kg (276 lb)    SpO2 96%    Breastfeeding No    BMI 45.93 kg/m2     General:  Alert, cooperative, no distress, appears stated age. Head:  Normocephalic, without obvious abnormality, atraumatic. Eyes:  Conjunctivae/corneas clear. PERRL, EOMs intact. Ears:  Normal external ear canals both ears. Nose: Nares normal. Septum midline. Mucosa normal. No drainage or sinus tenderness. Throat: Lips, mucosa, and tongue normal. Teeth and gums normal.   Neck: Supple, symmetrical, trachea midline, no adenopathy, thyroid: no enlargement/tenderness/nodules, no carotid bruit and no JVD. Back:   Symmetric, no curvature. ROM normal. No CVA tenderness. Lungs:   Clear to auscultation bilaterally. Chest wall:  No tenderness or deformity. Heart:  Regular rate and rhythm, S1, S2 normal, no murmur, click, rub or gallop. Abdomen:   Soft, non-tender. Bowel sounds normal. No masses,  No organomegaly. Extremities: Extremities normal, atraumatic, no cyanosis or edema. Pulses: 2+ and symmetric all extremities. Skin: Skin color, texture, turgor normal. No rashes or lesions   Lymph nodes: Cervical, supraclavicular, and axillary nodes normal.   Neurologic: CNII-XII intact. Normal strength, sensation and reflexes throughout. Data Review:   No results found for this or any previous visit (from the past 24 hour(s)).     Assessment:     Principal Problem:    Schizoaffective disorder, bipolar type ([facility] (10/28/2017)    Active Problems: Alcohol abuse (10/28/2017)      Dependence on nicotine from cigarettes (10/28/2017)    Obesity    HTN    Asthma    Plan:     Add Hctz 12.5mg po every day. Restart Proventil inhaler    No VTE prophylaxis indicated or necessary at this time.    Signed By: Ramon Vallejo MD     October 28, 2017

## 2017-10-29 NOTE — PROGRESS NOTES
Problem: Manic Behavior (Adult/Pediatric)  Goal: *STG: Remains safe in hospital  The patient was observed lying in bed with eyes closed; the patient didn't appear to be in any distress. The patient had unlabored breathing. The patient slept for 7 hours and was safe during the night.

## 2017-10-29 NOTE — PROGRESS NOTES
Problem: Manic Behavior (Adult/Pediatric)  Goal: *STG: Participates in treatment plan  Outcome: Progressing Towards Goal  Patient remains alert and oriented x4. She has been visible on the unit. No behavioral management difficulties this shift. Patient did refuse her albuterol inhaler that's scheduled q4h. No complaints voiced and no acute distress noted. Q 15 minute checks maintained for safety.  Will continue to monitor for safety and reassess status as necessary

## 2017-10-29 NOTE — BH NOTES
GROUP THERAPY PROGRESS NOTE    Ruel Peres is participating in [facility].      Group time: 30 minutes    Personal goal for participation: daily orientation    Goal orientation: personal    Group therapy participation: active    Therapeutic interventions reviewed and discussed: yes    Impression of participation: ok

## 2017-10-29 NOTE — PROGRESS NOTES
Problem: Manic Behavior (Adult/Pediatric)  Goal: *STG: Remains safe in hospital  Outcome: Progressing Towards Goal  Pt is compliant with medications and meal.  Pt is visible on the unit and requires redirection for dressing inappropriately on the unit. Pt denies A/V hallucinations and denies S/H ideations. Will continue to monitor pt q15 minutes for safety and support.

## 2017-10-30 PROCEDURE — 74011250637 HC RX REV CODE- 250/637: Performed by: INTERNAL MEDICINE

## 2017-10-30 PROCEDURE — 65220000001 HC RM PRIVATE PSYCH

## 2017-10-30 PROCEDURE — 74011250637 HC RX REV CODE- 250/637: Performed by: PSYCHIATRY & NEUROLOGY

## 2017-10-30 RX ORDER — LISINOPRIL 20 MG/1
20 TABLET ORAL DAILY
COMMUNITY
End: 2017-11-06

## 2017-10-30 RX ORDER — HALOPERIDOL DECANOATE 100 MG/ML
100 INJECTION INTRAMUSCULAR
COMMUNITY
End: 2017-11-06

## 2017-10-30 RX ORDER — ARIPIPRAZOLE 400 MG
400 KIT INTRAMUSCULAR
COMMUNITY
End: 2017-11-06

## 2017-10-30 RX ADMIN — HALOPERIDOL 5 MG: 5 TABLET ORAL at 17:08

## 2017-10-30 RX ADMIN — LITHIUM CARBONATE 300 MG: 300 TABLET, FILM COATED, EXTENDED RELEASE ORAL at 21:11

## 2017-10-30 RX ADMIN — HALOPERIDOL 5 MG: 5 TABLET ORAL at 08:00

## 2017-10-30 RX ADMIN — BENZTROPINE MESYLATE 0.5 MG: 1 TABLET ORAL at 08:00

## 2017-10-30 RX ADMIN — ATENOLOL 25 MG: 25 TABLET ORAL at 08:00

## 2017-10-30 RX ADMIN — ALBUTEROL SULFATE 2 PUFF: 90 AEROSOL, METERED RESPIRATORY (INHALATION) at 21:10

## 2017-10-30 RX ADMIN — LITHIUM CARBONATE 300 MG: 300 TABLET, FILM COATED, EXTENDED RELEASE ORAL at 08:00

## 2017-10-30 RX ADMIN — ALBUTEROL SULFATE 2 PUFF: 90 AEROSOL, METERED RESPIRATORY (INHALATION) at 17:09

## 2017-10-30 NOTE — BH NOTES
PSYCHOSOCIAL ASSESSMENT  :Patient identifying info:  Disha Wyatt is a 40 y.o., female admitted 10/28/2017  5:22 AM     Presenting problem and precipitating factors: Pt was admitted under a TDO status to hospital. Pt was a poor hisotorian during this assessment, would not get out of bed and stated that she did not want to talk. Pt has a diagnoses of schizoaffective disorder and bipolar with a history of multiple hospitalizations. Pt has been observed shouting, throwing things into neighbors yard, destroying property, vandalizing a home with spray paint over the past few days per prescreening report. Pt has a hx of threatening gestures and aggression toward police, [facility] staff and peers. Mental status assessment: Pt's mood is irritable and does not want to complete assessment with this . Current psychiatric providers and contact info: Pt receives services through [facility]. Pt's  is Dixie Foster and she attends the day treatment program - [facility]. Previous psychiatric services/providers and response to treatment: Pt is open to continuing services through [facility]. Family history of mental illness: Unknown. Substance abuse history:    Social History   Substance Use Topics    Smoking status: Current Every Day Smoker    Smokeless tobacco: Never Used    Alcohol use Yes      Comment: binge drinking- has sig use in the past       Describe support system: Pt states that she enjoys spends time with peers at the clubhouse and talks regularly to her . Describe living arrangements and home environment:  Unknown at this time.    Health issues:   Hospital Problems  Never Reviewed          Codes Class Noted POA    * (Principal)Schizoaffective disorder, bipolar type ([facility] ICD-10-CM: F25.0  ICD-9-CM: 295.70  10/28/2017 Unknown        Alcohol abuse ICD-10-CM: F10.10  ICD-9-CM: 305.00  10/28/2017 Yes        Dependence on nicotine from cigarettes ICD-10-CM: U41.214  ICD-9-CM: 305.1  10/28/2017 Yes              Trauma history: None reported. Legal issues: Drunk in public and prison time in the past.     History of  service: None. Financial status: SSDI    Mormon/cultural factors: None reported. Education/work history: Unknown at this time. Have you been licensed as a justin care professional (current or ): No  Leisure and recreation preferences: Spending time at [employer]. Describe coping skills: Poor.      Liliana Dominguez  10/30/2017

## 2017-10-30 NOTE — PROGRESS NOTES
Problem: Manic Behavior (Adult/Pediatric)  Goal: *STG: Remains safe in hospital  Outcome: Progressing Towards Goal  Pt is alert. She is compliant with medications and meals. She is visible on the unit with minimal interaction with peers and staff. Pt affect is flat and her mood labile. Encourage pt to attend groups and activities. Pt denies any needs at this time. Will continue to monitor pt q15 minutes for safety and support.

## 2017-10-30 NOTE — PROGRESS NOTES
Laboratory monitoring for mood stabilizer and antipsychotics:    Recommended baseline monitoring has been completed based on this patient's current medication regimen. The following labs were completed at transferring facility: WBC 12.3, Hgb 13.8, Hct 44, Plts 463, sodium 138, potassium 4.7, BUN 8, creatinine 0.58, glucose 85, calcium 9.6, UDS (-), BAL <10, HCG negative       The patient is currently taking the following medication(s):   Current Facility-Administered Medications   Medication Dose Route Frequency    lithium carbonate SR (LITHOBID) tablet 300 mg  300 mg Oral DAILY    lithium carbonate SR (LITHOBID) tablet 600 mg  600 mg Oral QHS    [START ON 11/24/2017] haloperidol decanoate (HALDOL DECANOATE) 100 mg/mL injection 150 mg  150 mg IntraMUSCular Q28D    atenolol (TENORMIN) tablet 25 mg  25 mg Oral DAILY    haloperidol (HALDOL) tablet 5 mg  5 mg Oral BID    benztropine (COGENTIN) tablet 0.5 mg  0.5 mg Oral DAILY       Serum Drug Level(s)  LITHIUM  Lab Results   Component Value Date/Time    Lithium level 0.52 11/06/2017 06:09 AM       Height, Weight, BMI Estimation  Estimated body mass index is 45.93 kg/(m^2) as calculated from the following:    Height as of this encounter: 165.1 cm (65\"). Weight as of this encounter: 125.2 kg (276 lb). Renal Function, Hepatic Function and Chemistry  CrCl cannot be calculated (No order found. ). Lab Results   Component Value Date/Time    ALT (SGPT) 34 11/06/2017 05:26 AM    AST (SGOT) 18 11/06/2017 05:26 AM    Alk.  phosphatase 71 11/06/2017 05:26 AM    Protein, total 7.2 11/06/2017 05:26 AM    Albumin 3.2 11/06/2017 05:26 AM    Globulin 4.0 11/06/2017 05:26 AM    A-G Ratio 0.8 11/06/2017 05:26 AM    Bilirubin, total 0.3 11/06/2017 05:26 AM       Lab Results   Component Value Date/Time    Hemoglobin A1c 5.4 11/06/2017 05:27 AM       Hematology  See above    Lipids  Lab Results   Component Value Date/Time    Cholesterol, total 158 11/06/2017 06:08 AM    HDL Cholesterol 31 11/06/2017 06:08 AM    LDL, calculated 87.6 11/06/2017 06:08 AM    Triglyceride 197 11/06/2017 06:08 AM    CHOL/HDL Ratio 5.1 11/06/2017 06:08 AM       Thyroid Function    Lab Results   Component Value Date/Time    TSH 4.64 11/06/2017 05:27 AM     Vitals  Visit Vitals    /89    Pulse 68    Temp 98 °F (36.7 °C)    Resp 16    Ht 165.1 cm (65\")    Wt 125.2 kg (276 lb)    SpO2 96%    Breastfeeding No    BMI 45.93 kg/m2       Fortino Cintron, PharmD, BCPS  [phone] (pharmacy)

## 2017-10-30 NOTE — PROGRESS NOTES
[facility] Medication Reconciliation     Recommendations/Findings:   1) [facility] Abilify Maintena injection on 10/20/17. 2) Per outpatient physician notes, patient was started on oral haloperidol on 10/16/17 and was administered the haloperidol decanoate 100 mg IM injection on 10/27/17 with notes to discontinue the oral haloperidol at that time. Please be advised that the recommendation is to continue oral haloperidol for the first month after receiving the first injection. 3) This patient is currently receiving 2 long acting antipsychotic injections, please consider continuing with only 1 of those injections. Total Time Spent: 30 minutes    Information obtained from: [address] [facility] [address] [facility] ([phone])    Patient allergies: Allergies as of 10/28/2017    (No Known Allergies)       Prior to Admission Medications   Prescriptions Last Dose Informant Patient Reported? Taking? ARIPiprazole (ABILIFY MAINTENA) 400 mg injection 10/20/2017  Yes Yes   Si mg by IntraMUSCular route every thirty (30) days. Indications: BIPOLAR DISORDER IN REMISSION   benztropine (COGENTIN) 0.5 mg tablet   Yes Yes   Sig: Take 0.5 mg by mouth two (2) times a day. Indications: extrapyramidal disease   haloperidol decanoate (HALDOL DECANOATE) 100 mg/mL injection 10/27/2017  Yes Yes   Si mg by IntraMUSCular route every twenty-eight (28) days. Indications: Bipolar disorder   hydroCHLOROthiazide (HYDRODIURIL) 25 mg tablet   Yes Yes   Sig: Take 25 mg by mouth daily. Indications: hypertension   lisinopril (PRINIVIL, ZESTRIL) 20 mg tablet   Yes Yes   Sig: Take 20 mg by mouth daily.  Indications: hypertension      Facility-Administered Medications: None       Thank you,  Alicia Hull, [address], [facility]  [phone]

## 2017-10-30 NOTE — BH NOTES
PSYCHIATRIC PROGRESS NOTE         Patient Name  Aleida Mcgill   Date of Birth 1980   [facility] [id number]   Medical Record Number  [id number]      Age  40 y.o. PCP Claudia Hernandez MD   Admit date:  10/28/2017    Room Number  734/51  @ [facility]   Date of Service  10/30/2017          PSYCHOTHERAPY SESSION NOTE:  Length of psychotherapy session: 15 minutes    Main condition/diagnosis/issues treated during session today, 10/30/2017 : psychosis, agitation, medication, tx compliance    I employed Cognitive Behavioral therapy techniques, Reality-Oriented psychotherapy, as well as supportive psychotherapy in regards to various ongoing psychosocial stressors, including the following: pre-admission and current problems; housing issues; occupational issues; academic issues; medical issues; and stress of hospitalization. Interpersonal relationship issues and psychodynamic conflicts explored. Attempts made to alleviate maladaptive patterns. We, also, worked on issues of denial & effects of substance dependency/use     Overall, patient is not progressing    Treatment Plan Update (reviewed an updated 10/30/2017) : I will modify psychotherapy tx plan by implementing more stress management strategies, building upon cognitive behavioral techniques, increasing coping skills, as well as shoring up psychological defenses). An extended energy and skill set was needed to engage pt in psychotherapy due to some of the following: resistiveness, complexity, negativity, confrontational nature, hostile behaviors, and/or severe abnormalities in thought processes/psychosis resulting in the loss of expressive/receptive language communication skills. E & M PROGRESS NOTE:         HISTORY       CC:  \"okay\"  HISTORY OF PRESENT ILLNESS/INTERVAL HISTORY:  (reviewed/updated 10/30/2017). per initial evaluation: The patient, Aleida Mcgill, is a 40 y.o.   WHITE [place]  female with a past psychiatric history significant for schizoaffective disorder, who presents at this time with complaints of (and/or evidence of) the following emotional symptoms: psychotic behavior. Additional symptomatology include impulsive, bizarre behavior ( tore up her room, painted her room with Ronaldo and red white and blue color). She is also reported to have been responding to internal stimuli- \"yelling at god\", alcohol abuse  and anger outbursts. On admission, pt is calm but appear guarded, paranoid delusional themes and with flat affect. Denies SI/HI. The above symptoms have been present for 1 week. These symptoms are of severe severity. These symptoms are constant in nature. The patient's condition has been precipitated by and psychosocial stressors (alcohol abuse, non compliance ). Patient's condition made worse by  alcohol use as well as treatment noncompliance. UDS: negative; BAL=0. [employer] presents/reports/evidences the following emotional symptoms today, 10/30/2017:psychotic behavior. The above symptoms have been present for 1 week. These symptoms are of severe severity. The symptoms are constant in nature. Additional symptomatology and features include labile mood, guarded with paranoid delusional themes. Refusing med and labs but after discussion agreeable to take her med. SIDE EFFECTS: (reviewed/updated 10/30/2017)  None reported or admitted to. No noted toxicity with use of current med   ALLERGIES:(reviewed/updated 10/30/2017)  No Known Allergies   MEDICATIONS PRIOR TO ADMISSION:(reviewed/updated 10/30/2017)  Prescriptions Prior to Admission   Medication Sig    ARIPiprazole (ABILIFY) 9.75 mg/1.3 mL injection by IntraMUSCular route daily.  white petrolatum-mineral oil (REFRESH LACRI-LUBE) 56.8-42.5 % ointment Administer  to both eyes every twelve (12) hours.  HALOPERIDOL DECANOATE IM by IntraMUSCular route.  HYPROMELLOSE (TEARISOL OP) Apply  to eye.     hydroCHLOROthiazide (HYDRODIURIL) 25 mg tablet Take 25 mg by mouth daily.  divalproex DR (DEPAKOTE) 500 mg tablet Take 1,000 mg by mouth nightly.  multivitamin (ONE A DAY) tablet Take 1 Tab by mouth daily.  albuterol (PROVENTIL HFA, VENTOLIN HFA, PROAIR HFA) 90 mcg/actuation inhaler Take  by inhalation every four (4) hours as needed.  benztropine (COGENTIN) 0.5 mg tablet Take 0.5 mg by mouth daily. PAST MEDICAL HISTORY: Past medical history from the initial psychiatric evaluation has been reviewed (reviewed/updated 10/30/2017) with no additional updates (I asked patient and no additional past medical history provided). Past Medical History:   Diagnosis Date    Hypertension    No past surgical history on file. SOCIAL HISTORY: Social history from the initial psychiatric evaluation has been reviewed (reviewed/updated 10/30/2017) with no additional updates (I asked patient and no additional social history provided). Social History     Social History    Marital status: SINGLE     Spouse name: N/A    Number of children: N/A    Years of education: N/A     Occupational History    Not on file. Social History Main Topics    Smoking status: Current Every Day Smoker    Smokeless tobacco: Never Used    Alcohol use Yes      Comment: binge drinking- has sig use in the past    Drug use: Yes     Special: Marijuana    Sexual activity: Yes     Other Topics Concern    Not on file     Social History Narrative    Broke up with her BF of 2 years. Single. Lives with her stepfather. Drunken in public offense-    No sexual abuse          FAMILY HISTORY: Family history from the initial psychiatric evaluation has been reviewed (reviewed/updated 10/30/2017) with no additional updates (I asked patient and no additional family history provided). No family history on file.     REVIEW OF SYSTEMS: (reviewed/updated 10/30/2017)  Appetite:good   Sleep: fitful   All other Review of Systems: Psychological ROS: positive for - behavioral disorder, concentration difficulties, irritability, mood swings and psychosis  Respiratory ROS: no cough, shortness of breath, or wheezing  Cardiovascular ROS: no chest pain or dyspnea on exertion  Neurological ROS: no TIA or stroke symptoms         MENTAL STATUS EXAM & VITALS     MENTAL STATUS EXAM (MSE):    MSE FINDINGS ARE WITHIN NORMAL LIMITS (WNL) UNLESS OTHERWISE STATED BELOW. ( ALL OF THE BELOW CATEGORIES OF THE MSE HAVE BEEN REVIEWED (reviewed 10/30/2017) AND UPDATED AS DEEMED APPROPRIATE )  General Presentation age appropriate and disheveled, guarded, passive and unreliable   Orientation oriented to time, place and person   Vital Signs  See below (reviewed 10/30/2017); Vital Signs (BP, Pulse, & Temp) are within normal limits if not listed below.    Gait and Station Stable/steady, no ataxia   Musculoskeletal System No extrapyramidal symptoms (EPS); no abnormal muscular movements or Tardive Dyskinesia (TD); muscle strength and tone are within normal limits   Language No aphasia or dysarthria   Speech:  monotone   Thought Processes illogical; slow rate of thoughts; poor abstract reasoning/computation   Thought Associations blocked    Thought Content paranoid delusions, free of hallucinations and internally preoccupied   Suicidal Ideations none   Homicidal Ideations none   Mood:  irritable and labile    Affect:  increased in intensity, irritable and labile   Memory recent  impaired   Memory remote:  intact   Concentration/Attention:  distractable   Fund of Knowledge average   Insight:  limited   Reliability poor   Judgment:  limited          VITALS:     Patient Vitals for the past 24 hrs:   Temp Pulse Resp BP   10/29/17 1600 96.5 °F (35.8 °C) (!) 105 18 (!) 129/98     Wt Readings from Last 3 Encounters:   10/28/17 125.2 kg (276 lb)     Temp Readings from Last 3 Encounters:   10/29/17 96.5 °F (35.8 °C)     BP Readings from Last 3 Encounters:   10/29/17 (!) 129/98     Pulse Readings from Last 3 Encounters:   10/29/17 (!) 105            DATA     LABORATORY DATA:(reviewed/updated 10/30/2017)  No results found for this or any previous visit (from the past 24 hour(s)). No results found for: VALF2, VALAC, VALP, VALPR, DS6, CRBAM, CRBAMP, CARB2, XCRBAM  No results found for: LITHM   RADIOLOGY REPORTS:(reviewed/updated 10/30/2017)  No results found.        MEDICATIONS     ALL MEDICATIONS:   Current Facility-Administered Medications   Medication Dose Route Frequency    atenolol (TENORMIN) tablet 25 mg  25 mg Oral DAILY    haloperidol (HALDOL) tablet 5 mg  5 mg Oral BID    benztropine (COGENTIN) tablet 2 mg  2 mg Oral BID PRN    benztropine (COGENTIN) injection 2 mg  2 mg IntraMUSCular BID PRN    LORazepam (ATIVAN) injection 2 mg  2 mg IntraMUSCular Q4H PRN    LORazepam (ATIVAN) tablet 1 mg  1 mg Oral Q4H PRN    zolpidem (AMBIEN) tablet 10 mg  10 mg Oral QHS PRN    acetaminophen (TYLENOL) tablet 650 mg  650 mg Oral Q4H PRN    magnesium hydroxide (MILK OF MAGNESIA) 400 mg/5 mL oral suspension 30 mL  30 mL Oral DAILY PRN    nicotine (NICODERM CQ) 21 mg/24 hr patch 1 Patch  1 Patch TransDERmal DAILY PRN    albuterol (PROVENTIL HFA, VENTOLIN HFA, PROAIR HFA) inhaler 2 Puff  2 Puff Inhalation Q4H RT    benztropine (COGENTIN) tablet 0.5 mg  0.5 mg Oral DAILY    haloperidol (HALDOL) tablet 5 mg  5 mg Oral Q6H PRN    haloperidol lactate (HALDOL) injection 5 mg  5 mg IntraMUSCular Q6H PRN    lithium carbonate SR (LITHOBID) tablet 300 mg  300 mg Oral BID      SCHEDULED MEDICATIONS:   Current Facility-Administered Medications   Medication Dose Route Frequency    atenolol (TENORMIN) tablet 25 mg  25 mg Oral DAILY    haloperidol (HALDOL) tablet 5 mg  5 mg Oral BID    albuterol (PROVENTIL HFA, VENTOLIN HFA, PROAIR HFA) inhaler 2 Puff  2 Puff Inhalation Q4H RT    benztropine (COGENTIN) tablet 0.5 mg  0.5 mg Oral DAILY    lithium carbonate SR (LITHOBID) tablet 300 mg  300 mg Oral BID          ASSESSMENT & PLAN     DIAGNOSES REQUIRING ACTIVE TREATMENT AND MONITORING: (reviewed/updated 10/30/2017)  Patient Active Hospital Problem List:   Schizoaffective disorder, bipolar type ([facility] (10/28/2017)    Assessment: acute on chronic- psychosis with agitation- calm on admission- f/u with local [facility]- states she received Haldol dec yesterday,? Abilify depot and not taking her Depakote- no sig change    Plan: I will ct to adjust med- need collateral info- ct with Haldol Dec and add PO, switch Depakote to Lithium   Alcohol abuse (10/28/2017)    Assessment: episodic per report, denies w/d sx    Plan: ct to monitor for w/d- BAL-ve   Dependence on nicotine from cigarettes (10/28/2017)    Assessment: chronic    Plan: nicotine patch      I will continue to monitor blood levels (Depakote, lithium--a drug with a narrow therapeutic index= NTI) and associated labs for drug therapy implemented that require intense monitoring for toxicity as deemed appropriate based on current medication side effects and pharmacodynamically determined drug 1/2 lives. In summary, Erika Patel, is a 40 y.o.  female who presents with a severe exacerbation of the principal diagnosis of Schizoaffective disorder, bipolar [facility]  Patient's condition is worsening/not improving/not stable . Patient requires continued inpatient hospitalization for further stabilization, safety monitoring and medication management. I will continue to coordinate the provision of individual, milieu, occupational, group, and substance abuse therapies to address target symptoms/diagnoses as deemed appropriate for the individual patient. A coordinated, multidisplinary treatment team round was conducted with the patient (this team consists of the nurse, psychiatric unit pharmcist,  and writer).      Complete current electronic health record for patient has been reviewed today including consultant notes, ancillary staff notes, nurses and psychiatric tech notes.    Suicide risk assessment completed and patient deemed to be of low risk for suicide at this time. The following regarding medications was addressed during rounds with patient:   the risks and benefits of the proposed medication. The patient was given the opportunity to ask questions. Informed consent given to the use of the above medications. Will continue to adjust psychiatric and non-psychiatric medications (see above \"medication\" section and orders section for details) as deemed appropriate & based upon diagnoses and response to treatment. I will continue to order blood tests/labs and diagnostic tests as deemed appropriate and review results as they become available (see orders for details and above listed lab/test results). I will order psychiatric records from previous [facility] hospitals to further elucidate the nature of patient's psychopathology and review once available. I will gather additional collateral information from friends, family and o/p treatment team to further elucidate the nature of patient's psychopathology and baselline level of psychiatric functioning. I certify that this patient's inpatient psychiatric hospital services furnished since the previous certification were, and continue to be, required for treatment that could reasonably be expected to improve the patient's condition, or for diagnostic study, and that the patient continues to need, on a daily basis, active treatment furnished directly by or requiring the supervision of inpatient psychiatric facility personnel. In addition, the hospital records show that services furnished were intensive treatment services, admission or related services, or equivalent services.     EXPECTED DISCHARGE DATE/DAY: TBD     DISPOSITION: Home       Signed By:   Yonas Pena MD  10/30/2017

## 2017-10-31 PROCEDURE — 93005 ELECTROCARDIOGRAM TRACING: CPT

## 2017-10-31 PROCEDURE — 74011250637 HC RX REV CODE- 250/637: Performed by: PSYCHIATRY & NEUROLOGY

## 2017-10-31 PROCEDURE — 74011250637 HC RX REV CODE- 250/637: Performed by: INTERNAL MEDICINE

## 2017-10-31 PROCEDURE — 65220000001 HC RM PRIVATE PSYCH

## 2017-10-31 RX ORDER — HALOPERIDOL DECANOATE 100 MG/ML
100 INJECTION INTRAMUSCULAR
Status: DISCONTINUED | OUTPATIENT
Start: 2017-11-24 | End: 2017-11-01

## 2017-10-31 RX ORDER — ALBUTEROL SULFATE 90 UG/1
2 AEROSOL, METERED RESPIRATORY (INHALATION)
Status: DISCONTINUED | OUTPATIENT
Start: 2017-10-31 | End: 2017-11-06 | Stop reason: HOSPADM

## 2017-10-31 RX ADMIN — LITHIUM CARBONATE 300 MG: 300 TABLET, FILM COATED, EXTENDED RELEASE ORAL at 21:36

## 2017-10-31 RX ADMIN — ATENOLOL 25 MG: 25 TABLET ORAL at 08:16

## 2017-10-31 RX ADMIN — HALOPERIDOL 5 MG: 5 TABLET ORAL at 17:25

## 2017-10-31 RX ADMIN — HALOPERIDOL 5 MG: 5 TABLET ORAL at 08:16

## 2017-10-31 RX ADMIN — ALBUTEROL SULFATE 2 PUFF: 90 AEROSOL, METERED RESPIRATORY (INHALATION) at 08:15

## 2017-10-31 RX ADMIN — BENZTROPINE MESYLATE 0.5 MG: 1 TABLET ORAL at 08:15

## 2017-10-31 RX ADMIN — LITHIUM CARBONATE 300 MG: 300 TABLET, FILM COATED, EXTENDED RELEASE ORAL at 08:16

## 2017-10-31 NOTE — PROGRESS NOTES
Problem: Manic Behavior (Adult/Pediatric)  Goal: *STG: Remains safe in hospital  Outcome: Progressing Towards Goal  Pt is alert and oriented, affect flat. Mood labile. Pt denies si/hi and contracts for safety. Pt reports no hallucinations. Pt has been isolative to room but attends groups with encouragement. Pt has been compliant with meds. Assist to reality test, assess mood and behavior, encourage to verbalize thoughts and feeling, med and illness teaching, encourage participation in tx plan, observe on q15' checks.

## 2017-10-31 NOTE — BH NOTES
GROUP THERAPY PROGRESS NOTE    The patient Yancy Carias is participating in [employer]. Group time: 30 minutes    Personal goal for participation: to orient the patient to the unit.     Goal orientation: successful adoption of unit rules    Group therapy participation: minimal    Therapeutic interventions reviewed and discussed: Yes    Impression of participation:minimal  Jose Barker  10/31/2017 8:38 AM

## 2017-10-31 NOTE — BH NOTES
GROUP THERAPY PROGRESS NOTE    The patient Chasity morris 40 y.o. female is participating in Creative Expression Group. Group time: 1 hour    Personal goal for participation: To concentrate on selected task    Goal orientation: social    Group therapy participation: active    Therapeutic interventions reviewed and discussed: Crafts, games, music    Impression of participation: The patient was attentive.     Mac Marie  10/31/2017  5:24 PM

## 2017-10-31 NOTE — BH NOTES
GROUP THERAPY PROGRESS NOTE    The patient Ericka morris 40 y.o. female is participating in Coping Skills Group. Group time: 45 minutes    Personal goal for participation: To participate in relaxation activity    Goal orientation:  relaxation    Group therapy participation: active    Therapeutic interventions reviewed and discussed: favorite ways to relax    Impression of participation:  The patient was attentive.     Felecia Lagunas  10/31/2017  5:15 PM

## 2017-10-31 NOTE — BH NOTES
Pt is alert. She is compliant with medications and meals. Pt was more isolative today. Pt affect is flat and her mood labile. Encourage pt to attend groups and activities. Pt denies any needs at this time.   Will continue to monitor pt q15 minutes for safety and support.

## 2017-10-31 NOTE — PROGRESS NOTES
[facility] ACTING ANTIPSYCHOTIC INJECTION CONFIRMATION    Name of injection Haloperidol decanoate   Total daily oral dose: 10 mg/day   Loading dose/initial dose: Received initial dose on 10/27/17 at [facility], still needs PO overlap for first month   Oral overlap required? Yes (see above)   Dose adjustments required?  No   Date of last injection or new start: 10/27/17   Confirmed information with: [facility]   Next injection due: 11/24/17

## 2017-10-31 NOTE — BH NOTES
Pt was received up, alert, and visible on unit when staff arrived. Pt is meal and med compliant. Pt isolates to her room majority of shift. Pt come out for groups very seldom. Voiced no concern of distress at this time. Will continue to monitor pt Q 15 min checks for safety and support.

## 2017-10-31 NOTE — BH NOTES
PSYCHIATRIC PROGRESS NOTE         Patient Name  Ben Dolan   Date of Birth 1980   [facility] [id number]   Medical Record Number  [id number]      Age  40 y.o. PCP Asaf Lemon MD   Admit date:  10/28/2017    Room Number  090/36  @ [facility]   Date of Service  10/31/2017          PSYCHOTHERAPY SESSION NOTE:  Length of psychotherapy session: 15 minutes    Main condition/diagnosis/issues treated during session today, 10/31/2017 : psychosis, agitation, medication, tx compliance    I employed Cognitive Behavioral therapy techniques, Reality-Oriented psychotherapy, as well as supportive psychotherapy in regards to various ongoing psychosocial stressors, including the following: pre-admission and current problems; housing issues; occupational issues; academic issues; medical issues; and stress of hospitalization. Interpersonal relationship issues and psychodynamic conflicts explored. Attempts made to alleviate maladaptive patterns. We, also, worked on issues of denial & effects of substance dependency/use     Overall, patient is not progressing    Treatment Plan Update (reviewed an updated 10/31/2017) : I will modify psychotherapy tx plan by implementing more stress management strategies, building upon cognitive behavioral techniques, increasing coping skills, as well as shoring up psychological defenses). An extended energy and skill set was needed to engage pt in psychotherapy due to some of the following: resistiveness, complexity, negativity, confrontational nature, hostile behaviors, and/or severe abnormalities in thought processes/psychosis resulting in the loss of expressive/receptive language communication skills. E & M PROGRESS NOTE:         HISTORY       CC:  \"okay\"  HISTORY OF PRESENT ILLNESS/INTERVAL HISTORY:  (reviewed/updated 10/31/2017). per initial evaluation: The patient, Ben Dolan, is a 40 y.o.   [place]ITE [place]  female with a past psychiatric history significant for schizoaffective disorder, who presents at this time with complaints of (and/or evidence of) the following emotional symptoms: psychotic behavior. Additional symptomatology include impulsive, bizarre behavior ( tore up her room, painted her room with Ronaldo and red white and blue color). She is also reported to have been responding to internal stimuli- \"yelling at god\", alcohol abuse  and anger outbursts. On admission, pt is calm but appear guarded, paranoid delusional themes and with flat affect. Denies SI/HI. The above symptoms have been present for 1 week. These symptoms are of severe severity. These symptoms are constant in nature. The patient's condition has been precipitated by and psychosocial stressors (alcohol abuse, non compliance ). Patient's condition made worse by  alcohol use as well as treatment noncompliance. UDS: negative; BAL=0. [employer] presents/reports/evidences the following emotional symptoms today, 10/31/2017:psychotic behavior. The above symptoms have been present for 1 week. These symptoms are of severe severity. The symptoms are constant in nature. Additional symptomatology and features include labile mood, guarded with paranoid delusional themes. Refusing med and labs but after discussion agreeable to take her med. 10/31- sl better affect, lot calmer and less irritable, accepting med, still unpredictable and labile mood. SIDE EFFECTS: (reviewed/updated 10/31/2017)  None reported or admitted to. No noted toxicity with use of current med   ALLERGIES:(reviewed/updated 10/31/2017)  No Known Allergies   MEDICATIONS PRIOR TO ADMISSION:(reviewed/updated 10/31/2017)  Prescriptions Prior to Admission   Medication Sig    ARIPiprazole (ABILIFY MAINTENA) 400 mg injection 400 mg by IntraMUSCular route every thirty (30) days.  Indications: BIPOLAR DISORDER IN REMISSION    haloperidol decanoate (HALDOL DECANOATE) 100 mg/mL injection 100 mg by IntraMUSCular route every twenty-eight (28) days. Indications: Bipolar disorder    lisinopril (PRINIVIL, ZESTRIL) 20 mg tablet Take 20 mg by mouth daily. Indications: hypertension    hydroCHLOROthiazide (HYDRODIURIL) 25 mg tablet Take 25 mg by mouth daily. Indications: hypertension    benztropine (COGENTIN) 0.5 mg tablet Take 0.5 mg by mouth two (2) times a day. Indications: extrapyramidal disease      PAST MEDICAL HISTORY: Past medical history from the initial psychiatric evaluation has been reviewed (reviewed/updated 10/31/2017) with no additional updates (I asked patient and no additional past medical history provided). Past Medical History:   Diagnosis Date    Hypertension    No past surgical history on file. SOCIAL HISTORY: Social history from the initial psychiatric evaluation has been reviewed (reviewed/updated 10/31/2017) with no additional updates (I asked patient and no additional social history provided). Social History     Social History    Marital status: SINGLE     Spouse name: N/A    Number of children: N/A    Years of education: N/A     Occupational History    Not on file. Social History Main Topics    Smoking status: Current Every Day Smoker    Smokeless tobacco: Never Used    Alcohol use Yes      Comment: binge drinking- has sig use in the past    Drug use: Yes     Special: Marijuana    Sexual activity: Yes     Other Topics Concern    Not on file     Social History Narrative    Broke up with her BF of 2 years. Single. Lives with her stepfather. Drunken in public offense-    No sexual abuse          FAMILY HISTORY: Family history from the initial psychiatric evaluation has been reviewed (reviewed/updated 10/31/2017) with no additional updates (I asked patient and no additional family history provided). No family history on file.     REVIEW OF SYSTEMS: (reviewed/updated 10/31/2017)  Appetite:good   Sleep: fitful   All other Review of Systems: Psychological ROS: positive for - behavioral disorder, concentration difficulties, irritability, mood swings and psychosis  Respiratory ROS: no cough, shortness of breath, or wheezing  Cardiovascular ROS: no chest pain or dyspnea on exertion  Neurological ROS: no TIA or stroke symptoms         MENTAL STATUS EXAM & VITALS     MENTAL STATUS EXAM (MSE):    MSE FINDINGS ARE WITHIN NORMAL LIMITS (WNL) UNLESS OTHERWISE STATED BELOW. ( ALL OF THE BELOW CATEGORIES OF THE MSE HAVE BEEN REVIEWED (reviewed 10/31/2017) AND UPDATED AS DEEMED APPROPRIATE )  General Presentation age appropriate and disheveled, guarded, passive and unreliable   Orientation oriented to time, place and person   Vital Signs  See below (reviewed 10/31/2017); Vital Signs (BP, Pulse, & Temp) are within normal limits if not listed below.    Gait and Station Stable/steady, no ataxia   Musculoskeletal System No extrapyramidal symptoms (EPS); no abnormal muscular movements or Tardive Dyskinesia (TD); muscle strength and tone are within normal limits   Language No aphasia or dysarthria   Speech:  monotone   Thought Processes illogical; slow rate of thoughts; poor abstract reasoning/computation   Thought Associations blocked    Thought Content paranoid delusions, free of hallucinations and internally preoccupied   Suicidal Ideations none   Homicidal Ideations none   Mood:  irritable and labile    Affect:  increased in intensity, irritable and labile   Memory recent  impaired   Memory remote:  intact   Concentration/Attention:  distractable   Fund of Knowledge average   Insight:  limited   Reliability poor   Judgment:  limited          VITALS:     Patient Vitals for the past 24 hrs:   Temp Pulse Resp BP   10/30/17 2134 - 87 18 135/89   10/30/17 1330 95.2 °F (35.1 °C) 74 18 (!) 145/92     Wt Readings from Last 3 Encounters:   10/28/17 125.2 kg (276 lb)     Temp Readings from Last 3 Encounters:   10/30/17 95.2 °F (35.1 °C)     BP Readings from Last 3 Encounters:   10/30/17 135/89 Pulse Readings from Last 3 Encounters:   10/30/17 87            DATA     LABORATORY DATA:(reviewed/updated 10/31/2017)  No results found for this or any previous visit (from the past 24 hour(s)). No results found for: VALF2, VALAC, VALP, VALPR, DS6, CRBAM, CRBAMP, CARB2, XCRBAM  No results found for: LITHM   RADIOLOGY REPORTS:(reviewed/updated 10/31/2017)  No results found.        MEDICATIONS     ALL MEDICATIONS:   Current Facility-Administered Medications   Medication Dose Route Frequency    [START ON 11/24/2017] haloperidol decanoate (HALDOL DECANOATE) 100 mg/mL injection 100 mg  100 mg IntraMUSCular Q28D    atenolol (TENORMIN) tablet 25 mg  25 mg Oral DAILY    haloperidol (HALDOL) tablet 5 mg  5 mg Oral BID    benztropine (COGENTIN) tablet 2 mg  2 mg Oral BID PRN    benztropine (COGENTIN) injection 2 mg  2 mg IntraMUSCular BID PRN    LORazepam (ATIVAN) injection 2 mg  2 mg IntraMUSCular Q4H PRN    LORazepam (ATIVAN) tablet 1 mg  1 mg Oral Q4H PRN    zolpidem (AMBIEN) tablet 10 mg  10 mg Oral QHS PRN    acetaminophen (TYLENOL) tablet 650 mg  650 mg Oral Q4H PRN    magnesium hydroxide (MILK OF MAGNESIA) 400 mg/5 mL oral suspension 30 mL  30 mL Oral DAILY PRN    nicotine (NICODERM CQ) 21 mg/24 hr patch 1 Patch  1 Patch TransDERmal DAILY PRN    albuterol (PROVENTIL HFA, VENTOLIN HFA, PROAIR HFA) inhaler 2 Puff  2 Puff Inhalation Q4H RT    benztropine (COGENTIN) tablet 0.5 mg  0.5 mg Oral DAILY    haloperidol (HALDOL) tablet 5 mg  5 mg Oral Q6H PRN    haloperidol lactate (HALDOL) injection 5 mg  5 mg IntraMUSCular Q6H PRN    lithium carbonate SR (LITHOBID) tablet 300 mg  300 mg Oral BID      SCHEDULED MEDICATIONS:   Current Facility-Administered Medications   Medication Dose Route Frequency    [START ON 11/24/2017] haloperidol decanoate (HALDOL DECANOATE) 100 mg/mL injection 100 mg  100 mg IntraMUSCular Q28D    atenolol (TENORMIN) tablet 25 mg  25 mg Oral DAILY    haloperidol (HALDOL) tablet 5 mg  5 mg Oral BID    albuterol (PROVENTIL HFA, VENTOLIN HFA, PROAIR HFA) inhaler 2 Puff  2 Puff Inhalation Q4H RT    benztropine (COGENTIN) tablet 0.5 mg  0.5 mg Oral DAILY    lithium carbonate SR (LITHOBID) tablet 300 mg  300 mg Oral BID          ASSESSMENT & PLAN     DIAGNOSES REQUIRING ACTIVE TREATMENT AND MONITORING: (reviewed/updated 10/31/2017)  Patient C/Bruna [address] Problem List:   Schizoaffective disorder, bipolar type ([facility] [address] (10/28/2017)    Assessment: acute on chronic- psychosis with agitation- calm on admission- f/u with local [facility]- states she received Haldol dec yesterday,? Abilify depot and not taking her Depakote- sl better today. Pt received Mendel Elizondo in the community? Reviewed notes from [facility]    Plan: I will ct to adjust med-  ct with Haldol Dec and add PO Haldol, titrate Lithium    Patient is on two antipsychotics now due to the relative refractoriness to treatment thus far. Prior to admission patient had THREE or more failed trails of monotherapy, including: Haldol, Abilify and Zyprexa. The patient is a very slow responder to medications. Risks and benefits in the use of two antipsychotics have been weighed in full, including the risk of metabolic syndrome and the potential increased risk of QTc  Based on this analysis, it is considered favorable for the utilization of two antipsychotics. In the future, once stable on the two antipsychotics, patient can possibly be tapered to one of the chosen antipsychotics. Will check on EKG results today to monitor QTc.        Alcohol abuse (10/28/2017)    Assessment: episodic per report, denies w/d sx    Plan: ct to monitor for w/d- BAL-ve   Dependence on nicotine from cigarettes (10/28/2017)    Assessment: chronic    Plan: nicotine patch      I will continue to monitor blood levels (, lithium--a drug with a narrow therapeutic index= NTI) and associated labs for drug therapy implemented that require intense monitoring for toxicity as deemed appropriate based on current medication side effects and pharmacodynamically determined drug 1/2 lives. In summary, Sharee Bassett, is a 40 y.o.  female who presents with a severe exacerbation of the principal diagnosis of Schizoaffective disorder, bipolar type ([facility] [address]  Patient's condition is worsening/not improving/not stable . Patient requires continued inpatient hospitalization for further stabilization, safety monitoring and medication management. I will continue to coordinate the provision of individual, milieu, occupational, group, and substance abuse therapies to address target symptoms/diagnoses as deemed appropriate for the individual patient. A coordinated, multidisplinary treatment team round was conducted with the patient (this team consists of the nurse, psychiatric unit pharmcist,  and writer). Complete current electronic health record for patient has been reviewed today including consultant notes, ancillary staff notes, nurses and psychiatric tech notes. Suicide risk assessment completed and patient deemed to be of low risk for suicide at this time. The following regarding medications was addressed during rounds with patient:   the risks and benefits of the proposed medication. The patient was given the opportunity to ask questions. Informed consent given to the use of the above medications. Will continue to adjust psychiatric and non-psychiatric medications (see above \"medication\" section and orders section for details) as deemed appropriate & based upon diagnoses and response to treatment. I will continue to order blood tests/labs and diagnostic tests as deemed appropriate and review results as they become available (see orders for details and above listed lab/test results). I will order psychiatric records from previous [facility] hospitals to further elucidate the nature of patient's psychopathology and review once available.     I will gather additional collateral information from friends, family and o/p treatment team to further elucidate the nature of patient's psychopathology and baselline level of psychiatric functioning. I certify that this patient's inpatient psychiatric hospital services furnished since the previous certification were, and continue to be, required for treatment that could reasonably be expected to improve the patient's condition, or for diagnostic study, and that the patient continues to need, on a daily basis, active treatment furnished directly by or requiring the supervision of inpatient psychiatric facility personnel. In addition, the hospital records show that services furnished were intensive treatment services, admission or related services, or equivalent services.     EXPECTED DISCHARGE DATE/DAY: TBD     DISPOSITION: Home       Signed By:   Chari Carrion MD  10/31/2017

## 2017-11-01 LAB
ATRIAL RATE: 72 BPM
CALCULATED P AXIS, ECG09: 77 DEGREES
CALCULATED R AXIS, ECG10: 100 DEGREES
CALCULATED T AXIS, ECG11: 61 DEGREES
DIAGNOSIS, 93000: NORMAL
P-R INTERVAL, ECG05: 200 MS
Q-T INTERVAL, ECG07: 388 MS
QRS DURATION, ECG06: 82 MS
QTC CALCULATION (BEZET), ECG08: 424 MS
VENTRICULAR RATE, ECG03: 72 BPM

## 2017-11-01 PROCEDURE — 74011250637 HC RX REV CODE- 250/637: Performed by: PSYCHIATRY & NEUROLOGY

## 2017-11-01 PROCEDURE — 65220000001 HC RM PRIVATE PSYCH

## 2017-11-01 PROCEDURE — 74011250637 HC RX REV CODE- 250/637: Performed by: INTERNAL MEDICINE

## 2017-11-01 RX ORDER — LITHIUM CARBONATE 300 MG/1
600 TABLET, FILM COATED, EXTENDED RELEASE ORAL
Status: DISCONTINUED | OUTPATIENT
Start: 2017-11-01 | End: 2017-11-06 | Stop reason: HOSPADM

## 2017-11-01 RX ORDER — HALOPERIDOL DECANOATE 100 MG/ML
150 INJECTION INTRAMUSCULAR
Status: DISCONTINUED | OUTPATIENT
Start: 2017-11-24 | End: 2017-11-06 | Stop reason: HOSPADM

## 2017-11-01 RX ORDER — LITHIUM CARBONATE 300 MG/1
300 TABLET, FILM COATED, EXTENDED RELEASE ORAL DAILY
Status: DISCONTINUED | OUTPATIENT
Start: 2017-11-02 | End: 2017-11-06 | Stop reason: HOSPADM

## 2017-11-01 RX ADMIN — BENZTROPINE MESYLATE 0.5 MG: 1 TABLET ORAL at 08:38

## 2017-11-01 RX ADMIN — HALOPERIDOL 5 MG: 5 TABLET ORAL at 08:37

## 2017-11-01 RX ADMIN — HALOPERIDOL 5 MG: 5 TABLET ORAL at 17:03

## 2017-11-01 RX ADMIN — LITHIUM CARBONATE 300 MG: 300 TABLET, FILM COATED, EXTENDED RELEASE ORAL at 08:37

## 2017-11-01 RX ADMIN — ATENOLOL 25 MG: 25 TABLET ORAL at 08:38

## 2017-11-01 RX ADMIN — LITHIUM CARBONATE 600 MG: 300 TABLET, FILM COATED, EXTENDED RELEASE ORAL at 21:16

## 2017-11-01 NOTE — BH NOTES
GROUP THERAPY PROGRESS NOTE    The patient Sharee Bassett a 40 y.o. female is participating in Coping Skills Group. Group time: 45 minutes    Personal goal for participation: To participate in self esteem booster    Goal orientation:  personal    Group therapy participation: active    Therapeutic interventions reviewed and discussed: handout-People With Mental Illness [employer] Our [facility]    Impression of participation:  The patient was attentive.     Verna Barron  11/1/2017  2:01 PM

## 2017-11-01 NOTE — BH NOTES
GROUP THERAPY PROGRESS NOTE    Aleida Mcgill is participating in [employer].      Group time: 30 minutes    Personal goal for participation:  Unit orientation    Goal orientation: Community    Group therapy participation: active    Therapeutic interventions reviewed and discussed: Yes    Impression of participation: good

## 2017-11-01 NOTE — BH NOTES
PSYCHIATRIC PROGRESS NOTE         Patient Name  Estela Ortiz   Date of Birth 1980   [facility] [id number]   Medical Record Number  [id number]      Age  40 y.o. PCP Ramonita Lewis MD   Admit date:  10/28/2017    Room Number  220/79  @ [facility]   Date of Service  11/1/2017          PSYCHOTHERAPY SESSION NOTE:  Length of psychotherapy session: 15 minutes    Main condition/diagnosis/issues treated during session today, 11/1/2017 : psychosis, agitation, medication, tx compliance    I employed Cognitive Behavioral therapy techniques, Reality-Oriented psychotherapy, as well as supportive psychotherapy in regards to various ongoing psychosocial stressors, including the following: pre-admission and current problems; housing issues; occupational issues; academic issues; medical issues; and stress of hospitalization. Interpersonal relationship issues and psychodynamic conflicts explored. Attempts made to alleviate maladaptive patterns. We, also, worked on issues of denial & effects of substance dependency/use     Overall, patient is not progressing    Treatment Plan Update (reviewed an updated 11/1/2017) : I will modify psychotherapy tx plan by implementing more stress management strategies, building upon cognitive behavioral techniques, increasing coping skills, as well as shoring up psychological defenses). An extended energy and skill set was needed to engage pt in psychotherapy due to some of the following: resistiveness, complexity, negativity, confrontational nature, hostile behaviors, and/or severe abnormalities in thought processes/psychosis resulting in the loss of expressive/receptive language communication skills. E & M PROGRESS NOTE:         HISTORY       CC:  \"okay\"  HISTORY OF PRESENT ILLNESS/INTERVAL HISTORY:  (reviewed/updated 11/1/2017). per initial evaluation: The patient, Estela Ortiz, is a 40 y.o.   [place]ITE [place]  female with a past psychiatric history significant for schizoaffective disorder, who presents at this time with complaints of (and/or evidence of) the following emotional symptoms: psychotic behavior. Additional symptomatology include impulsive, bizarre behavior ( tore up her room, painted her room with Ronaldo and red white and blue color). She is also reported to have been responding to internal stimuli- \"yelling at god\", alcohol abuse  and anger outbursts. On admission, pt is calm but appear guarded, paranoid delusional themes and with flat affect. Denies SI/HI. The above symptoms have been present for 1 week. These symptoms are of severe severity. These symptoms are constant in nature. The patient's condition has been precipitated by and psychosocial stressors (alcohol abuse, non compliance ). Patient's condition made worse by  alcohol use as well as treatment noncompliance. UDS: negative; BAL=0. [employer] presents/reports/evidences the following emotional symptoms today, 11/1/2017:psychotic behavior. The above symptoms have been present for 1 week. These symptoms are of severe severity. The symptoms are constant in nature. Additional symptomatology and features include labile mood, guarded with paranoid delusional themes. Refusing med and labs but after discussion agreeable to take her med. 11/1-- irritable at times but affect seems to be better, no agitation, accepting med, still unpredictable and labile mood. Denies hallucination       SIDE EFFECTS: (reviewed/updated 11/1/2017)  None reported or admitted to. No noted toxicity with use of current med   ALLERGIES:(reviewed/updated 11/1/2017)  No Known Allergies   MEDICATIONS PRIOR TO ADMISSION:(reviewed/updated 11/1/2017)  Prescriptions Prior to Admission   Medication Sig    ARIPiprazole (ABILIFY MAINTENA) 400 mg injection 400 mg by IntraMUSCular route every thirty (30) days.  Indications: BIPOLAR DISORDER IN REMISSION    haloperidol decanoate (HALDOL DECANOATE) 100 mg/mL injection 100 mg by IntraMUSCular route every twenty-eight (28) days. Indications: Bipolar disorder    lisinopril (PRINIVIL, ZESTRIL) 20 mg tablet Take 20 mg by mouth daily. Indications: hypertension    hydroCHLOROthiazide (HYDRODIURIL) 25 mg tablet Take 25 mg by mouth daily. Indications: hypertension    benztropine (COGENTIN) 0.5 mg tablet Take 0.5 mg by mouth two (2) times a day. Indications: extrapyramidal disease      PAST MEDICAL HISTORY: Past medical history from the initial psychiatric evaluation has been reviewed (reviewed/updated 11/1/2017) with no additional updates (I asked patient and no additional past medical history provided). Past Medical History:   Diagnosis Date    Hypertension    No past surgical history on file. SOCIAL HISTORY: Social history from the initial psychiatric evaluation has been reviewed (reviewed/updated 11/1/2017) with no additional updates (I asked patient and no additional social history provided). Social History     Social History    Marital status: SINGLE     Spouse name: N/A    Number of children: N/A    Years of education: N/A     Occupational History    Not on file. Social History Main Topics    Smoking status: Current Every Day Smoker    Smokeless tobacco: Never Used    Alcohol use Yes      Comment: binge drinking- has sig use in the past    Drug use: Yes     Special: Marijuana    Sexual activity: Yes     Other Topics Concern    Not on file     Social History Narrative    Broke up with her BF of 2 years. Single. Lives with her stepfather. Drunken in public offense-    No sexual abuse          FAMILY HISTORY: Family history from the initial psychiatric evaluation has been reviewed (reviewed/updated 11/1/2017) with no additional updates (I asked patient and no additional family history provided). No family history on file.     REVIEW OF SYSTEMS: (reviewed/updated 11/1/2017)  Appetite:good   Sleep: fitful   All other Review of Systems: Psychological ROS: positive for - behavioral disorder, concentration difficulties, irritability, mood swings and psychosis  Respiratory ROS: no cough, shortness of breath, or wheezing  Cardiovascular ROS: no chest pain or dyspnea on exertion  Neurological ROS: no TIA or stroke symptoms         MENTAL STATUS EXAM & VITALS     MENTAL STATUS EXAM (MSE):    MSE FINDINGS ARE WITHIN NORMAL LIMITS (WNL) UNLESS OTHERWISE STATED BELOW. ( ALL OF THE BELOW CATEGORIES OF THE MSE HAVE BEEN REVIEWED (reviewed 11/1/2017) AND UPDATED AS DEEMED APPROPRIATE )  General Presentation age appropriate and disheveled, unreliable and vague   Orientation oriented to time, place and person   Vital Signs  See below (reviewed 11/1/2017); Vital Signs (BP, Pulse, & Temp) are within normal limits if not listed below. Gait and Station Stable/steady, no ataxia   Musculoskeletal System No extrapyramidal symptoms (EPS); no abnormal muscular movements or Tardive Dyskinesia (TD); muscle strength and tone are within normal limits   Language No aphasia or dysarthria   Speech:  monotone   Thought Processes illogical; slow rate of thoughts; poor abstract reasoning/computation   Thought Associations blocked    Thought Content paranoid delusions, free of hallucinations and internally preoccupied   Suicidal Ideations none   Homicidal Ideations none   Mood:  irritable and labile    Affect:  increased in intensity, irritable and labile   Memory recent  impaired   Memory remote:  intact   Concentration/Attention:  distractable   Fund of Knowledge average   Insight:  limited   Reliability poor   Judgment:  limited          VITALS:     No data found.     Wt Readings from Last 3 Encounters:   10/28/17 125.2 kg (276 lb)     Temp Readings from Last 3 Encounters:   10/30/17 95.2 °F (35.1 °C)     BP Readings from Last 3 Encounters:   10/30/17 135/89     Pulse Readings from Last 3 Encounters:   10/30/17 87            DATA     LABORATORY DATA:(reviewed/updated 11/1/2017)  Recent Results (from the past 24 hour(s))   EKG, 12 LEAD, INITIAL    Collection Time: 10/31/17 11:53 AM   Result Value Ref Range    Ventricular Rate 72 BPM    Atrial Rate 72 BPM    P-R Interval 200 ms    QRS Duration 82 ms    Q-T Interval 388 ms    QTC Calculation (Bezet) 424 ms    Calculated P Axis 77 degrees    Calculated R Axis 100 degrees    Calculated T Axis 61 degrees    Diagnosis       Normal sinus rhythm  Rightward axis  Borderline ECG  No previous ECGs available       No results found for: VALF2, VALAC, VALP, VALPR, DS6, CRBAM, CRBAMP, CARB2, XCRBAM  No results found for: LITHM   RADIOLOGY REPORTS:(reviewed/updated 11/1/2017)  No results found.        MEDICATIONS     ALL MEDICATIONS:   Current Facility-Administered Medications   Medication Dose Route Frequency    [START ON 11/2/2017] lithium carbonate SR (LITHOBID) tablet 300 mg  300 mg Oral DAILY    lithium carbonate SR (LITHOBID) tablet 600 mg  600 mg Oral QHS    [START ON 11/24/2017] haloperidol decanoate (HALDOL DECANOATE) 100 mg/mL injection 150 mg  150 mg IntraMUSCular Q28D    albuterol (PROVENTIL HFA, VENTOLIN HFA, PROAIR HFA) inhaler 2 Puff  2 Puff Inhalation Q4H PRN    atenolol (TENORMIN) tablet 25 mg  25 mg Oral DAILY    haloperidol (HALDOL) tablet 5 mg  5 mg Oral BID    benztropine (COGENTIN) tablet 2 mg  2 mg Oral BID PRN    benztropine (COGENTIN) injection 2 mg  2 mg IntraMUSCular BID PRN    LORazepam (ATIVAN) injection 2 mg  2 mg IntraMUSCular Q4H PRN    LORazepam (ATIVAN) tablet 1 mg  1 mg Oral Q4H PRN    zolpidem (AMBIEN) tablet 10 mg  10 mg Oral QHS PRN    acetaminophen (TYLENOL) tablet 650 mg  650 mg Oral Q4H PRN    magnesium hydroxide (MILK OF MAGNESIA) 400 mg/5 mL oral suspension 30 mL  30 mL Oral DAILY PRN    nicotine (NICODERM CQ) 21 mg/24 hr patch 1 Patch  1 Patch TransDERmal DAILY PRN    benztropine (COGENTIN) tablet 0.5 mg  0.5 mg Oral DAILY    haloperidol (HALDOL) tablet 5 mg  5 mg Oral Q6H PRN    haloperidol lactate (HALDOL) injection 5 mg  5 mg IntraMUSCular Q6H PRN      SCHEDULED MEDICATIONS:   Current Facility-Administered Medications   Medication Dose Route Frequency    [START ON 11/2/2017] lithium carbonate SR (LITHOBID) tablet 300 mg  300 mg Oral DAILY    lithium carbonate SR (LITHOBID) tablet 600 mg  600 mg Oral QHS    [START ON 11/24/2017] haloperidol decanoate (HALDOL DECANOATE) 100 mg/mL injection 150 mg  150 mg IntraMUSCular Q28D    atenolol (TENORMIN) tablet 25 mg  25 mg Oral DAILY    haloperidol (HALDOL) tablet 5 mg  5 mg Oral BID    benztropine (COGENTIN) tablet 0.5 mg  0.5 mg Oral DAILY          ASSESSMENT & PLAN     DIAGNOSES REQUIRING ACTIVE TREATMENT AND MONITORING: (reviewed/updated 11/1/2017)  Patient C/Bruna [address] Problem List:   Schizoaffective disorder, bipolar type ([facility] [address] (10/28/2017)    Assessment: minimal change- less irritable, denies hallucination    Plan: I will ct to adjust med-  Titrate Lithium , consider maintenance Haldol Dec 150 mg next dose and dc Abilifdeonte Cent[place]. Patient is on two antipsychotics now due to the relative refractoriness to treatment thus far. Prior to admission patient had THREE or more failed trails of monotherapy, including: Haldol, Abilify and Zyprexa. The patient is a very slow responder to medications. Risks and benefits in the use of two antipsychotics have been weighed in full, including the risk of metabolic syndrome and the potential increased risk of QTc  Based on this analysis, it is considered favorable for the utilization of two antipsychotics. In the future, once stable on the two antipsychotics, patient can possibly be tapered to one of the chosen antipsychotics.   Will check on EKG results today to monitor QTc.- 424       Alcohol abuse (10/28/2017)    Assessment: episodic per report, denies w/d sx    Plan: ct to monitor for w/d- BAL-ve   Dependence on nicotine from cigarettes (10/28/2017)    Assessment: chronic    Plan: nicotine patch      I will continue to monitor blood levels (, lithium--a drug with a narrow therapeutic index= NTI) and associated labs for drug therapy implemented that require intense monitoring for toxicity as deemed appropriate based on current medication side effects and pharmacodynamically determined drug 1/2 lives. - level due on Monday    In summary, Disha Wyatt, is a 40 y.o.  female who presents with a severe exacerbation of the principal diagnosis of Schizoaffective disorder, bipolar type ([facility] [address]  Patient's condition is worsening/not improving/not stable . Patient requires continued inpatient hospitalization for further stabilization, safety monitoring and medication management. I will continue to coordinate the provision of individual, milieu, occupational, group, and substance abuse therapies to address target symptoms/diagnoses as deemed appropriate for the individual patient. A coordinated, multidisplinary treatment team round was conducted with the patient (this team consists of the nurse, psychiatric unit pharmcist,  and writer). Complete current electronic health record for patient has been reviewed today including consultant notes, ancillary staff notes, nurses and psychiatric tech notes. Suicide risk assessment completed and patient deemed to be of low risk for suicide at this time. The following regarding medications was addressed during rounds with patient:   the risks and benefits of the proposed medication. The patient was given the opportunity to ask questions. Informed consent given to the use of the above medications. Will continue to adjust psychiatric and non-psychiatric medications (see above \"medication\" section and orders section for details) as deemed appropriate & based upon diagnoses and response to treatment.      I will continue to order blood tests/labs and diagnostic tests as deemed appropriate and review results as they become available (see orders for details and above listed lab/test results). I will order psychiatric records from previous [facility] hospitals to further elucidate the nature of patient's psychopathology and review once available. I will gather additional collateral information from friends, family and o/p treatment team to further elucidate the nature of patient's psychopathology and baselline level of psychiatric functioning. I certify that this patient's inpatient psychiatric hospital services furnished since the previous certification were, and continue to be, required for treatment that could reasonably be expected to improve the patient's condition, or for diagnostic study, and that the patient continues to need, on a daily basis, active treatment furnished directly by or requiring the supervision of inpatient psychiatric facility personnel. In addition, the hospital records show that services furnished were intensive treatment services, admission or related services, or equivalent services.     EXPECTED DISCHARGE DATE/DAY: TBD     DISPOSITION: Home       Signed By:   Melinda Amor MD  11/1/2017

## 2017-11-01 NOTE — PROGRESS NOTES
Problem: Manic Behavior (Adult/Pediatric)  Goal: *STG: Participates in treatment plan  Outcome: Progressing Towards Goal  Patient is calm and cooperative. Visible on the unit. Medication and meal complaint. Patient is attending groups and socializing with peers. Will continue to monitor patient and assess needs.

## 2017-11-01 NOTE — BH NOTES
GROUP THERAPY PROGRESS NOTE    The patient Nury morris 40 y.o. female is participating in [employer]. Group time: 1 hour    Personal goal for participation: To concentrate on selected task    Goal orientation: social    Group therapy participation: active    Therapeutic interventions reviewed and discussed: Crafts, games, music    Impression of participation: The patient was attentive.     David Lagunas  11/1/2017  4:59 PM

## 2017-11-02 PROCEDURE — 74011250637 HC RX REV CODE- 250/637: Performed by: PSYCHIATRY & NEUROLOGY

## 2017-11-02 PROCEDURE — 74011250637 HC RX REV CODE- 250/637: Performed by: INTERNAL MEDICINE

## 2017-11-02 PROCEDURE — 65220000001 HC RM PRIVATE PSYCH

## 2017-11-02 RX ADMIN — LITHIUM CARBONATE 300 MG: 300 TABLET, FILM COATED, EXTENDED RELEASE ORAL at 07:55

## 2017-11-02 RX ADMIN — HALOPERIDOL 5 MG: 5 TABLET ORAL at 17:23

## 2017-11-02 RX ADMIN — HALOPERIDOL 5 MG: 5 TABLET ORAL at 07:54

## 2017-11-02 RX ADMIN — BENZTROPINE MESYLATE 0.5 MG: 1 TABLET ORAL at 07:54

## 2017-11-02 RX ADMIN — ATENOLOL 25 MG: 25 TABLET ORAL at 07:54

## 2017-11-02 RX ADMIN — LITHIUM CARBONATE 600 MG: 300 TABLET, FILM COATED, EXTENDED RELEASE ORAL at 21:48

## 2017-11-02 NOTE — BH NOTES
PSYCHIATRIC PROGRESS NOTE         Patient Name  Yash Oakley   Date of Birth 1980   CSN [id number]   Medical Record Number  [id number]      Age  40 y.o. PCP Arden Cortés MD   Admit date:  10/28/2017    Room Number  926/67  @ [facility]   Date of Service  11/2/2017          PSYCHOTHERAPY SESSION NOTE:  Length of psychotherapy session: 15 minutes    Main condition/diagnosis/issues treated during session today, 11/2/2017 : psychosis, agitation, medication, tx compliance    I employed Cognitive Behavioral therapy techniques, Reality-Oriented psychotherapy, as well as supportive psychotherapy in regards to various ongoing psychosocial stressors, including the following: pre-admission and current problems; housing issues; occupational issues; academic issues; medical issues; and stress of hospitalization. Interpersonal relationship issues and psychodynamic conflicts explored. Attempts made to alleviate maladaptive patterns. We, also, worked on issues of denial & effects of substance dependency/use     Overall, patient is slowly progressing    Treatment Plan Update (reviewed an updated 11/2/2017) : I will modify psychotherapy tx plan by implementing more stress management strategies, building upon cognitive behavioral techniques, increasing coping skills, as well as shoring up psychological defenses). An extended energy and skill set was needed to engage pt in psychotherapy due to some of the following: resistiveness, complexity, negativity, confrontational nature, hostile behaviors, and/or severe abnormalities in thought processes/psychosis resulting in the loss of expressive/receptive language communication skills. E & M PROGRESS NOTE:         HISTORY       CC:  \"okay\"  HISTORY OF PRESENT ILLNESS/INTERVAL HISTORY:  (reviewed/updated 11/2/2017). per initial evaluation: The patient, Yash Oakley, is a 40 y.o.   [place]ITE [place]  female with a past psychiatric history significant for schizoaffective disorder, who presents at this time with complaints of (and/or evidence of) the following emotional symptoms: psychotic behavior. Additional symptomatology include impulsive, bizarre behavior ( tore up her room, painted her room with Ronaldo and red white and blue color). She is also reported to have been responding to internal stimuli- \"yelling at god\", alcohol abuse  and anger outbursts. On admission, pt is calm but appear guarded, paranoid delusional themes and with flat affect. Denies SI/HI. The above symptoms have been present for 1 week. These symptoms are of severe severity. These symptoms are constant in nature. The patient's condition has been precipitated by and psychosocial stressors (alcohol abuse, non compliance ). Patient's condition made worse by  alcohol use as well as treatment noncompliance. UDS: negative; BAL=0. [employer] presents/reports/evidences the following emotional symptoms today, 11/2/2017:psychotic behavior. The above symptoms have been present for 1 week. These symptoms are of severe severity. The symptoms are constant in nature. Additional symptomatology and features include labile mood, guarded with paranoid delusional themes. Refusing med and labs but after discussion agreeable to take her med. 11/2--affect is better, no agitation and insight is sl better. Less disorganized, and more predictable denies SI/HI/AVH       SIDE EFFECTS: (reviewed/updated 11/2/2017)  None reported or admitted to. No noted toxicity with use of current med   ALLERGIES:(reviewed/updated 11/2/2017)  No Known Allergies   MEDICATIONS PRIOR TO ADMISSION:(reviewed/updated 11/2/2017)  Prescriptions Prior to Admission   Medication Sig    ARIPiprazole (ABILIFY MAINTENA) 400 mg injection 400 mg by IntraMUSCular route every thirty (30) days.  Indications: BIPOLAR DISORDER IN REMISSION    haloperidol decanoate (HALDOL DECANOATE) 100 mg/mL injection 100 mg by IntraMUSCular route every twenty-eight (28) days. Indications: Bipolar disorder    lisinopril (PRINIVIL, ZESTRIL) 20 mg tablet Take 20 mg by mouth daily. Indications: hypertension    hydroCHLOROthiazide (HYDRODIURIL) 25 mg tablet Take 25 mg by mouth daily. Indications: hypertension    benztropine (COGENTIN) 0.5 mg tablet Take 0.5 mg by mouth two (2) times a day. Indications: extrapyramidal disease      PAST MEDICAL HISTORY: Past medical history from the initial psychiatric evaluation has been reviewed (reviewed/updated 11/2/2017) with no additional updates (I asked patient and no additional past medical history provided). Past Medical History:   Diagnosis Date    Hypertension    No past surgical history on file. SOCIAL HISTORY: Social history from the initial psychiatric evaluation has been reviewed (reviewed/updated 11/2/2017) with no additional updates (I asked patient and no additional social history provided). Social History     Social History    Marital status: SINGLE     Spouse name: N/A    Number of children: N/A    Years of education: N/A     Occupational History    Not on file. Social History Main Topics    Smoking status: Current Every Day Smoker    Smokeless tobacco: Never Used    Alcohol use Yes      Comment: binge drinking- has sig use in the past    Drug use: Yes     Special: Marijuana    Sexual activity: Yes     Other Topics Concern    Not on file     Social History Narrative    Broke up with her BF of 2 years. Single. Lives with her stepfather. Drunken in public offense-    No sexual abuse          FAMILY HISTORY: Family history from the initial psychiatric evaluation has been reviewed (reviewed/updated 11/2/2017) with no additional updates (I asked patient and no additional family history provided). No family history on file.     REVIEW OF SYSTEMS: (reviewed/updated 11/2/2017)  Appetite:good   Sleep: fitful   All other Review of Systems: Psychological ROS: positive for - behavioral disorder, concentration difficulties, irritability, mood swings and psychosis  Respiratory ROS: no cough, shortness of breath, or wheezing  Cardiovascular ROS: no chest pain or dyspnea on exertion  Neurological ROS: no TIA or stroke symptoms         MENTAL STATUS EXAM & VITALS     MENTAL STATUS EXAM (MSE):    MSE FINDINGS ARE WITHIN NORMAL LIMITS (WNL) UNLESS OTHERWISE STATED BELOW. ( ALL OF THE BELOW CATEGORIES OF THE MSE HAVE BEEN REVIEWED (reviewed 11/2/2017) AND UPDATED AS DEEMED APPROPRIATE )  General Presentation age appropriate and disheveled, unreliable and vague   Orientation oriented to time, place and person   Vital Signs  See below (reviewed 11/2/2017); Vital Signs (BP, Pulse, & Temp) are within normal limits if not listed below.    Gait and Station Stable/steady, no ataxia   Musculoskeletal System No extrapyramidal symptoms (EPS); no abnormal muscular movements or Tardive Dyskinesia (TD); muscle strength and tone are within normal limits   Language No aphasia or dysarthria   Speech:  monotone   Thought Processes illogical; slow rate of thoughts; poor abstract reasoning/computation   Thought Associations blocked    Thought Content Less intense, free of AH   Suicidal Ideations none   Homicidal Ideations none   Mood:  Anxious, less labile   Affect:   stable, euthymic   Memory recent  impaired   Memory remote:  intact   Concentration/Attention:  distractable   Fund of Knowledge average   Insight:  limited   Reliability poor   Judgment:  limited          VITALS:     Patient Vitals for the past 24 hrs:   Temp Pulse Resp BP   11/02/17 0614 - 69 18 140/83     Wt Readings from Last 3 Encounters:   10/28/17 125.2 kg (276 lb)     Temp Readings from Last 3 Encounters:   No data found for Temp     BP Readings from Last 3 Encounters:   11/02/17 140/83     Pulse Readings from Last 3 Encounters:   11/02/17 69            DATA     LABORATORY DATA:(reviewed/updated 11/2/2017)  No results found for this or any previous visit (from the past 24 hour(s)). No results found for: VALF2, VALAC, VALP, VALPR, DS6, CRBAM, CRBAMP, CARB2, XCRBAM  No results found for: LITHM   RADIOLOGY REPORTS:(reviewed/updated 11/2/2017)  No results found.        MEDICATIONS     ALL MEDICATIONS:   Current Facility-Administered Medications   Medication Dose Route Frequency    lithium carbonate SR (LITHOBID) tablet 300 mg  300 mg Oral DAILY    lithium carbonate SR (LITHOBID) tablet 600 mg  600 mg Oral QHS    [START ON 11/24/2017] haloperidol decanoate (HALDOL DECANOATE) 100 mg/mL injection 150 mg  150 mg IntraMUSCular Q28D    albuterol (PROVENTIL HFA, VENTOLIN HFA, PROAIR HFA) inhaler 2 Puff  2 Puff Inhalation Q4H PRN    atenolol (TENORMIN) tablet 25 mg  25 mg Oral DAILY    haloperidol (HALDOL) tablet 5 mg  5 mg Oral BID    benztropine (COGENTIN) tablet 2 mg  2 mg Oral BID PRN    benztropine (COGENTIN) injection 2 mg  2 mg IntraMUSCular BID PRN    LORazepam (ATIVAN) injection 2 mg  2 mg IntraMUSCular Q4H PRN    LORazepam (ATIVAN) tablet 1 mg  1 mg Oral Q4H PRN    zolpidem (AMBIEN) tablet 10 mg  10 mg Oral QHS PRN    acetaminophen (TYLENOL) tablet 650 mg  650 mg Oral Q4H PRN    magnesium hydroxide (MILK OF MAGNESIA) 400 mg/5 mL oral suspension 30 mL  30 mL Oral DAILY PRN    nicotine (NICODERM CQ) 21 mg/24 hr patch 1 Patch  1 Patch TransDERmal DAILY PRN    benztropine (COGENTIN) tablet 0.5 mg  0.5 mg Oral DAILY    haloperidol (HALDOL) tablet 5 mg  5 mg Oral Q6H PRN    haloperidol lactate (HALDOL) injection 5 mg  5 mg IntraMUSCular Q6H PRN      SCHEDULED MEDICATIONS:   Current Facility-Administered Medications   Medication Dose Route Frequency    lithium carbonate SR (LITHOBID) tablet 300 mg  300 mg Oral DAILY    lithium carbonate SR (LITHOBID) tablet 600 mg  600 mg Oral QHS    [START ON 11/24/2017] haloperidol decanoate (HALDOL DECANOATE) 100 mg/mL injection 150 mg  150 mg IntraMUSCular Q28D    atenolol (TENORMIN) tablet 25 mg  25 mg Oral DAILY    haloperidol (HALDOL) tablet 5 mg  5 mg Oral BID    benztropine (COGENTIN) tablet 0.5 mg  0.5 mg Oral DAILY          ASSESSMENT & PLAN     DIAGNOSES REQUIRING ACTIVE TREATMENT AND MONITORING: (reviewed/updated 11/2/2017)  Patient [facility] Problem List:   Schizoaffective disorder, bipolar type ([address] (10/28/2017)    Assessment: slowly progressing- less irritable,affect is better denies hallucination    Plan: I will ct to adjust med- response to med- Lithium ,haldol po, consider maintenance Haldol Dec 150 mg next dose and dc Abilify Maintenna. Patient is on two antipsychotics now due to the relative refractoriness to treatment thus far. Prior to admission patient had THREE or more failed trails of monotherapy, including: Haldol, Abilify and Zyprexa. The patient is a very slow responder to medications. Risks and benefits in the use of two antipsychotics have been weighed in full, including the risk of metabolic syndrome and the potential increased risk of QTc  Based on this analysis, it is considered favorable for the utilization of two antipsychotics. In the future, once stable on the two antipsychotics, patient can possibly be tapered to one of the chosen antipsychotics. Will check on EKG results today to monitor QTc.- 424       Alcohol abuse (10/28/2017)    Assessment: episodic per report, denies w/d sx    Plan: ct to monitor for w/d- BAL-ve   Dependence on nicotine from cigarettes (10/28/2017)    Assessment: chronic    Plan: nicotine patch      I will continue to monitor blood levels (, lithium--a drug with a narrow therapeutic index= NTI) and associated labs for drug therapy implemented that require intense monitoring for toxicity as deemed appropriate based on current medication side effects and pharmacodynamically determined drug 1/2 lives. - level due on Monday    In summary, Disha Wyatt, is a 40 y.o.  female who presents with a severe exacerbation of the principal diagnosis of Schizoaffective disorder, bipolar type ([facility] [address]  Patient's condition is worsening/not improving/not stable . Patient requires continued inpatient hospitalization for further stabilization, safety monitoring and medication management. I will continue to coordinate the provision of individual, milieu, occupational, group, and substance abuse therapies to address target symptoms/diagnoses as deemed appropriate for the individual patient. A coordinated, multidisplinary treatment team round was conducted with the patient (this team consists of the nurse, psychiatric unit pharmcist,  and writer). Complete current electronic health record for patient has been reviewed today including consultant notes, ancillary staff notes, nurses and psychiatric tech notes. Suicide risk assessment completed and patient deemed to be of low risk for suicide at this time. The following regarding medications was addressed during rounds with patient:   the risks and benefits of the proposed medication. The patient was given the opportunity to ask questions. Informed consent given to the use of the above medications. Will continue to adjust psychiatric and non-psychiatric medications (see above \"medication\" section and orders section for details) as deemed appropriate & based upon diagnoses and response to treatment. I will continue to order blood tests/labs and diagnostic tests as deemed appropriate and review results as they become available (see orders for details and above listed lab/test results). I will order psychiatric records from previous [facility] hospitals to further elucidate the nature of patient's psychopathology and review once available. I will gather additional collateral information from friends, family and o/p treatment team to further elucidate the nature of patient's psychopathology and baselline level of psychiatric functioning.          I certify that this patient's inpatient psychiatric hospital services furnished since the previous certification were, and continue to be, required for treatment that could reasonably be expected to improve the patient's condition, or for diagnostic study, and that the patient continues to need, on a daily basis, active treatment furnished directly by or requiring the supervision of inpatient psychiatric facility personnel. In addition, the hospital records show that services furnished were intensive treatment services, admission or related services, or equivalent services.     EXPECTED DISCHARGE DATE/DAY: next Monday     DISPOSITION: Home       Signed By:   Rosa Maria Milligan MD  11/2/2017

## 2017-11-02 NOTE — PROGRESS NOTES
Problem: Manic Behavior (Adult/Pediatric)  Goal: *STG: Remains safe in hospital  Outcome: Progressing Towards Goal  Pt is alert and oriented. Affect flat. Pt reports feeling less anxious. Denies SI/HI and contracts for safety. Pt denies psychotic symptoms. Pt is able to reality test. Pt is compliant with medication and meals. Pt attends groups and activities. Pt interacts appropriately with staff and peers. Pt is self care with ADLS. Pt participates in discharge planning. Continue to assess mood an behavior. Assist to reality test. Monitor on Q 15 checks.

## 2017-11-02 NOTE — BH NOTES
GROUP THERAPY PROGRESS NOTE    The patient Ben morris 40 y.o. female is participating in Coping Skills Group. Group time: 45 minutes    Personal goal for participation: To identify positive coping strategies a-z    Goal orientation:  personal    Group therapy participation: active    Therapeutic interventions reviewed and discussed: worksheet    Impression of participation:  The patient was attentive.     Elyse Lagunas  11/2/2017  5:04 PM

## 2017-11-02 NOTE — PROGRESS NOTES
Problem: Manic Behavior (Adult/Pediatric)  Goal: *STG: Remains safe in hospital  Outcome: Progressing Towards Goal  Patient is visible on the unit. Calm and cooperative. Attending groups and socializing with peers. Will continue to monitor patient and assess needs.

## 2017-11-02 NOTE — BH NOTES
GROUP THERAPY PROGRESS NOTE    Dangelo Randolph is participating in [facility]asher.      Group time: 30 minutes    Personal goal for participation: daily orientation    Goal orientation: personal    Group therapy participation: active    Therapeutic interventions reviewed and discussed: yes    Impression of participation: Attend    Brayan Mccarthy  11/2/2017

## 2017-11-03 PROCEDURE — 74011250637 HC RX REV CODE- 250/637: Performed by: INTERNAL MEDICINE

## 2017-11-03 PROCEDURE — 74011250637 HC RX REV CODE- 250/637: Performed by: PSYCHIATRY & NEUROLOGY

## 2017-11-03 PROCEDURE — 65220000003 HC RM SEMIPRIVATE PSYCH

## 2017-11-03 RX ADMIN — HALOPERIDOL 5 MG: 5 TABLET ORAL at 16:54

## 2017-11-03 RX ADMIN — BENZTROPINE MESYLATE 0.5 MG: 1 TABLET ORAL at 07:52

## 2017-11-03 RX ADMIN — HALOPERIDOL 5 MG: 5 TABLET ORAL at 07:52

## 2017-11-03 RX ADMIN — LITHIUM CARBONATE 300 MG: 300 TABLET, FILM COATED, EXTENDED RELEASE ORAL at 07:52

## 2017-11-03 RX ADMIN — ATENOLOL 25 MG: 25 TABLET ORAL at 07:52

## 2017-11-03 RX ADMIN — LITHIUM CARBONATE 600 MG: 300 TABLET, FILM COATED, EXTENDED RELEASE ORAL at 21:17

## 2017-11-03 NOTE — BH NOTES
GROUP THERAPY PROGRESS NOTE    The patient Genna Ahumada a 40 y.o. female is participating in Coping Skills Group. Group time: 45 minutes    Personal goal for participation: To participate in mental health journey game    Goal orientation:  personal    Group therapy participation: active    Therapeutic interventions reviewed and discussed: choices in recovery    Impression of participation:  The patient was attentive.     Edwin Lagunas  11/3/2017  5:07 PM

## 2017-11-03 NOTE — PROGRESS NOTES
Problem: Manic Behavior (Adult/Pediatric)  Goal: *STG: Remains safe in hospital  Outcome: Progressing Towards Goal  Pt is oriented to Person, Place, Time and Situation. Pt affect euthymic and mood is euthymic. Speech shows no evidence of impairment. Pt  denies SI/HI. Pt denies A/V hallucinations. Pt shows no evidence of impairment. Pt compliant with all meds and meals. Will continue to monitor pt for safety and needs per hospital protocol. Will continue to encourage pt to participate in group therapy.

## 2017-11-03 NOTE — BH NOTES
Problem: Altered Thought Process (Adult/Pediatric)  Goal: *STG: Complies with medication therapy  Outcome: Progressing Towards Goal  Pt is mostly isolative from the milieu. Pt has brighter affect this shift, mood less preoccupied. Pt is meds/meals compliant. Pt has participated in her treatment team meeting today. Pt stated she is feeling better today. Pt is transferred to the general unit. Will continue to monitor q 15 for safety, mood and behavior changes.

## 2017-11-03 NOTE — BH NOTES
PSYCHIATRIC PROGRESS NOTE         Patient Name  Roly Reyes   Date of Birth 1980   [facility] [id number]   Medical Record Number  [id number]      Age  40 y.o. PCP Alissa Be MD   Admit date:  10/28/2017    Room Number  325/02  @ [facility]   Date of Service  11/3/2017          PSYCHOTHERAPY SESSION NOTE:  Length of psychotherapy session: 15 minutes    Main condition/diagnosis/issues treated during session today, 11/3/2017 : psychosis, agitation, medication, tx compliance    I employed Cognitive Behavioral therapy techniques, Reality-Oriented psychotherapy, as well as supportive psychotherapy in regards to various ongoing psychosocial stressors, including the following: pre-admission and current problems; housing issues; occupational issues; academic issues; medical issues; and stress of hospitalization. Interpersonal relationship issues and psychodynamic conflicts explored. Attempts made to alleviate maladaptive patterns. We, also, worked on issues of denial & effects of substance dependency/use     Overall, patient is slowly progressing    Treatment Plan Update (reviewed an updated 11/3/2017) : I will modify psychotherapy tx plan by implementing more stress management strategies, building upon cognitive behavioral techniques, increasing coping skills, as well as shoring up psychological defenses). An extended energy and skill set was needed to engage pt in psychotherapy due to some of the following: resistiveness, complexity, negativity, confrontational nature, hostile behaviors, and/or severe abnormalities in thought processes/psychosis resulting in the loss of expressive/receptive language communication skills. E & M PROGRESS NOTE:         HISTORY       CC:  \"okay\"  HISTORY OF PRESENT ILLNESS/INTERVAL HISTORY:  (reviewed/updated 11/3/2017). per initial evaluation: The patient, Roly Reyes, is a 40 y.o.   [place]ITE [place]  female with a past psychiatric history significant for schizoaffective disorder, who presents at this time with complaints of (and/or evidence of) the following emotional symptoms: psychotic behavior. Additional symptomatology include impulsive, bizarre behavior ( tore up her room, painted her room with Ronaldo and red white and blue color). She is also reported to have been responding to internal stimuli- \"yelling at god\", alcohol abuse  and anger outbursts. On admission, pt is calm but appear guarded, paranoid delusional themes and with flat affect. Denies SI/HI. The above symptoms have been present for 1 week. These symptoms are of severe severity. These symptoms are constant in nature. The patient's condition has been precipitated by and psychosocial stressors (alcohol abuse, non compliance ). Patient's condition made worse by  alcohol use as well as treatment noncompliance. UDS: negative; BAL=0. [employer] presents/reports/evidences the following emotional symptoms today, 11/3/2017:psychotic behavior. The above symptoms have been present for 1 week. These symptoms are of severe severity. The symptoms are constant in nature. Additional symptomatology and features include labile mood, guarded with paranoid delusional themes. Refusing med and labs but after discussion agreeable to take her med. 11/3--pleasant and co operative, less disorganized thought process and no agitation. affect is better,preoccupied with dc. denies SI/HI/AVH       SIDE EFFECTS: (reviewed/updated 11/3/2017)  None reported or admitted to. No noted toxicity with use of current med   ALLERGIES:(reviewed/updated 11/3/2017)  No Known Allergies   MEDICATIONS PRIOR TO ADMISSION:(reviewed/updated 11/3/2017)  Prescriptions Prior to Admission   Medication Sig    ARIPiprazole (ABILIFY MAINTENA) 400 mg injection 400 mg by IntraMUSCular route every thirty (30) days.  Indications: BIPOLAR DISORDER IN REMISSION    haloperidol decanoate (HALDOL DECANOATE) 100 mg/mL injection 100 mg by IntraMUSCular route every twenty-eight (28) days. Indications: Bipolar disorder    lisinopril (PRINIVIL, ZESTRIL) 20 mg tablet Take 20 mg by mouth daily. Indications: hypertension    hydroCHLOROthiazide (HYDRODIURIL) 25 mg tablet Take 25 mg by mouth daily. Indications: hypertension    benztropine (COGENTIN) 0.5 mg tablet Take 0.5 mg by mouth two (2) times a day. Indications: extrapyramidal disease      PAST MEDICAL HISTORY: Past medical history from the initial psychiatric evaluation has been reviewed (reviewed/updated 11/3/2017) with no additional updates (I asked patient and no additional past medical history provided). Past Medical History:   Diagnosis Date    Hypertension    No past surgical history on file. SOCIAL HISTORY: Social history from the initial psychiatric evaluation has been reviewed (reviewed/updated 11/3/2017) with no additional updates (I asked patient and no additional social history provided). Social History     Social History    Marital status: SINGLE     Spouse name: N/A    Number of children: N/A    Years of education: N/A     Occupational History    Not on file. Social History Main Topics    Smoking status: Current Every Day Smoker    Smokeless tobacco: Never Used    Alcohol use Yes      Comment: binge drinking- has sig use in the past    Drug use: Yes     Special: Marijuana    Sexual activity: Yes     Other Topics Concern    Not on file     Social History Narrative    Broke up with her BF of 2 years. Single. Lives with her stepfather. Drunken in public offense-    No sexual abuse          FAMILY HISTORY: Family history from the initial psychiatric evaluation has been reviewed (reviewed/updated 11/3/2017) with no additional updates (I asked patient and no additional family history provided). No family history on file.     REVIEW OF SYSTEMS: (reviewed/updated 11/3/2017)  Appetite:good   Sleep: fitful   All other Review of Systems: Psychological ROS: positive for - behavioral disorder, concentration difficulties, irritability, mood swings and psychosis  Respiratory ROS: no cough, shortness of breath, or wheezing  Cardiovascular ROS: no chest pain or dyspnea on exertion  Neurological ROS: no TIA or stroke symptoms         MENTAL STATUS EXAM & VITALS     MENTAL STATUS EXAM (MSE):    MSE FINDINGS ARE WITHIN NORMAL LIMITS (WNL) UNLESS OTHERWISE STATED BELOW. ( ALL OF THE BELOW CATEGORIES OF THE MSE HAVE BEEN REVIEWED (reviewed 11/3/2017) AND UPDATED AS DEEMED APPROPRIATE )  General Presentation age appropriate and disheveled, unreliable and vague   Orientation oriented to time, place and person   Vital Signs  See below (reviewed 11/3/2017); Vital Signs (BP, Pulse, & Temp) are within normal limits if not listed below.    Gait and Station Stable/steady, no ataxia   Musculoskeletal System No extrapyramidal symptoms (EPS); no abnormal muscular movements or Tardive Dyskinesia (TD); muscle strength and tone are within normal limits   Language No aphasia or dysarthria   Speech:  monotone   Thought Processes illogical; slow rate of thoughts; poor abstract reasoning/computation   Thought Associations Goal directed   Thought Content Less intense, free of AH   Suicidal Ideations none   Homicidal Ideations none   Mood:  Anxious, less labile   Affect:   stable, euthymic   Memory recent  impaired   Memory remote:  intact   Concentration/Attention:  distractable   Fund of Knowledge average   Insight:  limited   Reliability poor   Judgment:  limited          VITALS:     Patient Vitals for the past 24 hrs:   Temp Pulse BP   11/03/17 0752 - 61 121/80     Wt Readings from Last 3 Encounters:   10/28/17 125.2 kg (276 lb)     Temp Readings from Last 3 Encounters:   No data found for Temp     BP Readings from Last 3 Encounters:   11/03/17 121/80     Pulse Readings from Last 3 Encounters:   11/03/17 61            DATA     LABORATORY DATA:(reviewed/updated 11/3/2017)  No results found for this or any previous visit (from the past 24 hour(s)). No results found for: VALF2, VALAC, VALP, VALPR, DS6, CRBAM, CRBAMP, CARB2, XCRBAM  No results found for: LITHM   RADIOLOGY REPORTS:(reviewed/updated 11/3/2017)  No results found.        MEDICATIONS     ALL MEDICATIONS:   Current Facility-Administered Medications   Medication Dose Route Frequency    lithium carbonate SR (LITHOBID) tablet 300 mg  300 mg Oral DAILY    lithium carbonate SR (LITHOBID) tablet 600 mg  600 mg Oral QHS    [START ON 11/24/2017] haloperidol decanoate (HALDOL DECANOATE) 100 mg/mL injection 150 mg  150 mg IntraMUSCular Q28D    albuterol (PROVENTIL HFA, VENTOLIN HFA, PROAIR HFA) inhaler 2 Puff  2 Puff Inhalation Q4H PRN    atenolol (TENORMIN) tablet 25 mg  25 mg Oral DAILY    haloperidol (HALDOL) tablet 5 mg  5 mg Oral BID    benztropine (COGENTIN) tablet 2 mg  2 mg Oral BID PRN    benztropine (COGENTIN) injection 2 mg  2 mg IntraMUSCular BID PRN    LORazepam (ATIVAN) injection 2 mg  2 mg IntraMUSCular Q4H PRN    LORazepam (ATIVAN) tablet 1 mg  1 mg Oral Q4H PRN    zolpidem (AMBIEN) tablet 10 mg  10 mg Oral QHS PRN    acetaminophen (TYLENOL) tablet 650 mg  650 mg Oral Q4H PRN    magnesium hydroxide (MILK OF MAGNESIA) 400 mg/5 mL oral suspension 30 mL  30 mL Oral DAILY PRN    nicotine (NICODERM CQ) 21 mg/24 hr patch 1 Patch  1 Patch TransDERmal DAILY PRN    benztropine (COGENTIN) tablet 0.5 mg  0.5 mg Oral DAILY    haloperidol (HALDOL) tablet 5 mg  5 mg Oral Q6H PRN    haloperidol lactate (HALDOL) injection 5 mg  5 mg IntraMUSCular Q6H PRN      SCHEDULED MEDICATIONS:   Current Facility-Administered Medications   Medication Dose Route Frequency    lithium carbonate SR (LITHOBID) tablet 300 mg  300 mg Oral DAILY    lithium carbonate SR (LITHOBID) tablet 600 mg  600 mg Oral QHS    [START ON 11/24/2017] haloperidol decanoate (HALDOL DECANOATE) 100 mg/mL injection 150 mg  150 mg IntraMUSCular Q28D    atenolol (TENORMIN) tablet 25 mg  25 mg Oral DAILY    haloperidol (HALDOL) tablet 5 mg  5 mg Oral BID    benztropine (COGENTIN) tablet 0.5 mg  0.5 mg Oral DAILY          ASSESSMENT & PLAN     DIAGNOSES REQUIRING ACTIVE TREATMENT AND MONITORING: (reviewed/updated 11/3/2017)  Patient Active Hospital Problem List:   Schizoaffective disorder, bipolar type ([facility] [address] (10/28/2017)    Assessment: slowly progressing- no rafaela and affect improving. denies hallucination    Plan: I will ct to adjust med- response to med- Lithium ,haldol po, consider maintenance Haldol Dec 150 mg next dose and dc Abilify Maintenna. Patient is on two antipsychotics now due to the relative refractoriness to treatment thus far. Prior to admission patient had THREE or more failed trails of monotherapy, including: Haldol, Abilify and Zyprexa. The patient is a very slow responder to medications. Risks and benefits in the use of two antipsychotics have been weighed in full, including the risk of metabolic syndrome and the potential increased risk of QTc  Based on this analysis, it is considered favorable for the utilization of two antipsychotics. In the future, once stable on the two antipsychotics, patient can possibly be tapered to one of the chosen antipsychotics. Will check on EKG results today to monitor QTc.- 424       Alcohol abuse (10/28/2017)    Assessment: episodic per report, denies w/d sx    Plan: ct to monitor for w/d- BAL-ve   Dependence on nicotine from cigarettes (10/28/2017)    Assessment: chronic    Plan: nicotine patch      I will continue to monitor blood levels (, lithium--a drug with a narrow therapeutic index= NTI) and associated labs for drug therapy implemented that require intense monitoring for toxicity as deemed appropriate based on current medication side effects and pharmacodynamically determined drug 1/2 lives. - level due on Monday    In summary, Dangelo Randolph, is a 40 y.o.  female who presents with a severe exacerbation of the principal diagnosis of Schizoaffective disorder, bipolar type ([address]  Patient's condition is worsening/not improving/not stable . Patient requires continued inpatient hospitalization for further stabilization, safety monitoring and medication management. I will continue to coordinate the provision of individual, milieu, occupational, group, and substance abuse therapies to address target symptoms/diagnoses as deemed appropriate for the individual patient. A coordinated, multidisplinary treatment team round was conducted with the patient (this team consists of the nurse, psychiatric unit pharmcist,  and writer). Complete current electronic health record for patient has been reviewed today including consultant notes, ancillary staff notes, nurses and psychiatric tech notes. Suicide risk assessment completed and patient deemed to be of low risk for suicide at this time. The following regarding medications was addressed during rounds with patient:   the risks and benefits of the proposed medication. The patient was given the opportunity to ask questions. Informed consent given to the use of the above medications. Will continue to adjust psychiatric and non-psychiatric medications (see above \"medication\" section and orders section for details) as deemed appropriate & based upon diagnoses and response to treatment. I will continue to order blood tests/labs and diagnostic tests as deemed appropriate and review results as they become available (see orders for details and above listed lab/test results). I will order psychiatric records from previous [facility] hospitals to further elucidate the nature of patient's psychopathology and review once available. I will gather additional collateral information from friends, family and o/p treatment team to further elucidate the nature of patient's psychopathology and baselline level of psychiatric functioning.          I certify that this patient's inpatient psychiatric hospital services furnished since the previous certification were, and continue to be, required for treatment that could reasonably be expected to improve the patient's condition, or for diagnostic study, and that the patient continues to need, on a daily basis, active treatment furnished directly by or requiring the supervision of inpatient psychiatric facility personnel. In addition, the hospital records show that services furnished were intensive treatment services, admission or related services, or equivalent services.     EXPECTED DISCHARGE DATE/DAY: next Monday     DISPOSITION: Home       Signed By:   Martinez Kothari MD  11/3/2017

## 2017-11-03 NOTE — BH NOTES
GROUP THERAPY PROGRESS NOTE    Alejandra Schofield is participating in [place].      Group time: 30 minutes    Personal goal for participation: daily orientation    Goal orientation: personal    Group therapy participation: active    Therapeutic interventions reviewed and discussed: yes    Impression of participation: Attend    Sin Saenz  11/3/2017

## 2017-11-04 PROCEDURE — 74011250637 HC RX REV CODE- 250/637: Performed by: PSYCHIATRY & NEUROLOGY

## 2017-11-04 PROCEDURE — 74011250637 HC RX REV CODE- 250/637: Performed by: INTERNAL MEDICINE

## 2017-11-04 PROCEDURE — 65220000003 HC RM SEMIPRIVATE PSYCH

## 2017-11-04 RX ADMIN — BENZTROPINE MESYLATE 0.5 MG: 1 TABLET ORAL at 08:29

## 2017-11-04 RX ADMIN — HALOPERIDOL 5 MG: 5 TABLET ORAL at 08:29

## 2017-11-04 RX ADMIN — ATENOLOL 25 MG: 25 TABLET ORAL at 08:29

## 2017-11-04 RX ADMIN — HALOPERIDOL 5 MG: 5 TABLET ORAL at 16:41

## 2017-11-04 RX ADMIN — LITHIUM CARBONATE 600 MG: 300 TABLET, FILM COATED, EXTENDED RELEASE ORAL at 21:02

## 2017-11-04 RX ADMIN — LITHIUM CARBONATE 300 MG: 300 TABLET, FILM COATED, EXTENDED RELEASE ORAL at 08:29

## 2017-11-04 NOTE — BH NOTES
GROUP THERAPY PROGRESS NOTE    The patient Genna Ahumada a 40 y.o. female is  Participating. Group time: 45 minutes    Personal goal for participation: Verbalize understanding. Goal orientation:   Increased socialization. Impression of participation: Patient is interested.

## 2017-11-04 NOTE — BH NOTES
Problem: Psychosis  Goal: *STG/LTG: Complies with medication therapy  Outcome: Progressing Towards Goal  Pt is mostly isolative from the milieu. Pt's affect is flat but mood calmer. Pt is meds/meals compliant. Pt has participated in her treatment team meeting today. Will continue to monitor q 15 for safety, mood and behavior changes.

## 2017-11-04 NOTE — PROGRESS NOTES
Problem: Manic Behavior (Adult/Pediatric)  Goal: *STG: Participates in treatment plan  Outcome: Progressing Towards Goal  Patient remains alert and oriented x4. She has been visible on the unit and engaged in group this shift. Affect appears a bit blunted at times but she is pleasant on approach. She has been medication and meal compliant. No complaints voiced and no acute distress noted. She denies any SI/SP or any HI/HP. Q 15 minute checks maintained for safety. Will continue to monitor for safety and reassess status as necessary.

## 2017-11-04 NOTE — BH NOTES
11p-7a Shift Note    Patient slept for a total of 7 hours this shift. No complaints voice and no acute distress noted.  Will continue to monitor patient and reassess status as necessary

## 2017-11-05 PROCEDURE — 74011250637 HC RX REV CODE- 250/637: Performed by: INTERNAL MEDICINE

## 2017-11-05 PROCEDURE — 74011250637 HC RX REV CODE- 250/637: Performed by: PSYCHIATRY & NEUROLOGY

## 2017-11-05 PROCEDURE — 65220000003 HC RM SEMIPRIVATE PSYCH

## 2017-11-05 RX ADMIN — BENZTROPINE MESYLATE 0.5 MG: 1 TABLET ORAL at 08:27

## 2017-11-05 RX ADMIN — HALOPERIDOL 5 MG: 5 TABLET ORAL at 08:28

## 2017-11-05 RX ADMIN — HALOPERIDOL 5 MG: 5 TABLET ORAL at 16:29

## 2017-11-05 RX ADMIN — LITHIUM CARBONATE 300 MG: 300 TABLET, FILM COATED, EXTENDED RELEASE ORAL at 08:28

## 2017-11-05 RX ADMIN — ATENOLOL 25 MG: 25 TABLET ORAL at 08:28

## 2017-11-05 RX ADMIN — LITHIUM CARBONATE 600 MG: 300 TABLET, FILM COATED, EXTENDED RELEASE ORAL at 21:22

## 2017-11-05 NOTE — BH NOTES
GROUP THERAPY PROGRESS NOTE    The patient Erika Abreu is participating in Reflection Group. Group time: 30 minutes    Personal goal for participation:  To reflect on today's occurences    Goal orientation: Reflection    Group therapy participation: Active    Therapeutic interventions reviewed and discussed: Yes    Rc Peña  11/4/2017

## 2017-11-05 NOTE — BH NOTES
Problem: Psychosis  Goal: *STG/LTG: Complies with medication therapy  Outcome: Progressing Towards Goal  Pt is mostly isolative from the milieu. Pt's affect is brighter, mood less labile. Pt is meds/meals compliant. Pt has participated in her treatment team meeting today. Will continue to monitor q 15 for safety, mood and behavior changes.

## 2017-11-05 NOTE — BH NOTES
PSYCHIATRIC PROGRESS NOTE         Patient Name  Estela Ortiz   Date of Birth 1980   [facility] [id number]   Medical Record Number  [id number]      Age  40 y.o. PCP Ramonita Lewis MD   Admit date:  10/28/2017    Room Number  325/02  @ [facility]   Date of Service  11/4/2017          PSYCHOTHERAPY SESSION NOTE:  Length of psychotherapy session: 15 minutes    Main condition/diagnosis/issues treated during session today, 11/4/2017 : psychosis, agitation, medication, tx compliance    I employed Cognitive Behavioral therapy techniques, Reality-Oriented psychotherapy, as well as supportive psychotherapy in regards to various ongoing psychosocial stressors, including the following: pre-admission and current problems; housing issues; occupational issues; academic issues; medical issues; and stress of hospitalization. Interpersonal relationship issues and psychodynamic conflicts explored. Attempts made to alleviate maladaptive patterns. We, also, worked on issues of denial & effects of substance dependency/use     Overall, patient is slowly progressing    Treatment Plan Update (reviewed an updated 11/4/2017) : I will modify psychotherapy tx plan by implementing more stress management strategies, building upon cognitive behavioral techniques, increasing coping skills, as well as shoring up psychological defenses). An extended energy and skill set was needed to engage pt in psychotherapy due to some of the following: resistiveness, complexity, negativity, confrontational nature, hostile behaviors, and/or severe abnormalities in thought processes/psychosis resulting in the loss of expressive/receptive language communication skills. E & M PROGRESS NOTE:         HISTORY       CC:  \"okay\"  HISTORY OF PRESENT ILLNESS/INTERVAL HISTORY:  (reviewed/updated 11/4/2017). per initial evaluation: The patient, Estela Ortiz, is a 40 y.o.   [place]ITE [place]  female with a past psychiatric history significant for schizoaffective disorder, who presents at this time with complaints of (and/or evidence of) the following emotional symptoms: psychotic behavior. Additional symptomatology include impulsive, bizarre behavior ( tore up her room, painted her room with Ronaldo and red white and blue color). She is also reported to have been responding to internal stimuli- \"yelling at god\", alcohol abuse  and anger outbursts. On admission, pt is calm but appear guarded, paranoid delusional themes and with flat affect. Denies SI/HI. The above symptoms have been present for 1 week. These symptoms are of severe severity. These symptoms are constant in nature. The patient's condition has been precipitated by and psychosocial stressors (alcohol abuse, non compliance ). Patient's condition made worse by  alcohol use as well as treatment noncompliance. UDS: negative; BAL=0. [employer] presents/reports/evidences the following emotional symptoms today, 11/4/2017:psychotic behavior. The above symptoms have been present for 1 week. These symptoms are of severe severity. The symptoms are constant in nature. Additional symptomatology and features include labile mood, guarded with paranoid delusional themes. Refusing med and labs but after discussion agreeable to take her med. 11/3--pleasant and co operative, less disorganized thought process and no agitation. affect is better,preoccupied with dc. denies SI/HI/AVH   11/4/17-Moved from Acute to the General side yesterday. Adjusting well. Compliant with meds and Tx. No prn's needed. Attending groups. SIDE EFFECTS: (reviewed/updated 11/4/2017)  None reported or admitted to.   No noted toxicity with use of current med   ALLERGIES:(reviewed/updated 11/4/2017)  No Known Allergies   MEDICATIONS PRIOR TO ADMISSION:(reviewed/updated 11/4/2017)  Prescriptions Prior to Admission   Medication Sig    ARIPiprazole (ABILIFY MAINTENA) 400 mg injection 400 mg by IntraMUSCular route every thirty (30) days. Indications: BIPOLAR DISORDER IN REMISSION    haloperidol decanoate (HALDOL DECANOATE) 100 mg/mL injection 100 mg by IntraMUSCular route every twenty-eight (28) days. Indications: Bipolar disorder    lisinopril (PRINIVIL, ZESTRIL) 20 mg tablet Take 20 mg by mouth daily. Indications: hypertension    hydroCHLOROthiazide (HYDRODIURIL) 25 mg tablet Take 25 mg by mouth daily. Indications: hypertension    benztropine (COGENTIN) 0.5 mg tablet Take 0.5 mg by mouth two (2) times a day. Indications: extrapyramidal disease      PAST MEDICAL HISTORY: Past medical history from the initial psychiatric evaluation has been reviewed (reviewed/updated 11/4/2017) with no additional updates (I asked patient and no additional past medical history provided). Past Medical History:   Diagnosis Date    Hypertension    No past surgical history on file. SOCIAL HISTORY: Social history from the initial psychiatric evaluation has been reviewed (reviewed/updated 11/4/2017) with no additional updates (I asked patient and no additional social history provided). Social History     Social History    Marital status: SINGLE     Spouse name: N/A    Number of children: N/A    Years of education: N/A     Occupational History    Not on file. Social History Main Topics    Smoking status: Current Every Day Smoker    Smokeless tobacco: Never Used    Alcohol use Yes      Comment: binge drinking- has sig use in the past    Drug use: Yes     Special: Marijuana    Sexual activity: Yes     Other Topics Concern    Not on file     Social History Narrative    Broke up with her BF of 2 years. Single. Lives with her stepfather. Drunken in public offense-    No sexual abuse          FAMILY HISTORY: Family history from the initial psychiatric evaluation has been reviewed (reviewed/updated 11/4/2017) with no additional updates (I asked patient and no additional family history provided). No family history on file. REVIEW OF SYSTEMS: (reviewed/updated 11/4/2017)  Appetite:good   Sleep: fitful   All other Review of Systems: Psychological ROS: positive for - behavioral disorder, concentration difficulties, irritability, mood swings and psychosis  Respiratory ROS: no cough, shortness of breath, or wheezing  Cardiovascular ROS: no chest pain or dyspnea on exertion  Neurological ROS: no TIA or stroke symptoms         [facility] ([facility]):    MSE FINDINGS ARE WITHIN NORMAL LIMITS (WNL) UNLESS OTHERWISE STATED BELOW. ( ALL OF THE BELOW CATEGORIES OF THE MSE HAVE BEEN REVIEWED (reviewed 11/4/2017) AND UPDATED AS DEEMED APPROPRIATE )  General Presentation age appropriate and disheveled, unreliable and vague   Orientation oriented to time, place and person   Vital Signs  See below (reviewed 11/4/2017); Vital Signs (BP, Pulse, & Temp) are within normal limits if not listed below.    Gait and Station Stable/steady, no ataxia   Musculoskeletal System No extrapyramidal symptoms (EPS); no abnormal muscular movements or Tardive Dyskinesia (TD); muscle strength and tone are within normal limits   Language No aphasia or dysarthria   Speech:  monotone   Thought Processes illogical; slow rate of thoughts; poor abstract reasoning/computation   Thought Associations Goal directed   Thought Content Less intense, free of AH   Suicidal Ideations none   Homicidal Ideations none   Mood:  Anxious, less labile   Affect:   stable, euthymic   Memory recent  impaired   Memory remote:  intact   Concentration/Attention:  distractable   Fund of Knowledge average   Insight:  limited   Reliability poor   Judgment:  limited          VITALS:     Patient Vitals for the past 24 hrs:   Temp Pulse Resp BP   11/04/17 0716 98 °F (36.7 °C) 86 16 107/78     Wt Readings from Last 3 Encounters:   10/28/17 125.2 kg (276 lb)     Temp Readings from Last 3 Encounters:   11/04/17 98 °F (36.7 °C)     BP Readings from Last 3 Encounters:   11/04/17 107/78     Pulse Readings from Last 3 Encounters:   11/04/17 86            DATA     LABORATORY DATA:(reviewed/updated 11/4/2017)  No results found for this or any previous visit (from the past 24 hour(s)). No results found for: VALF2, VALAC, VALP, VALPR, DS6, CRBAM, CRBAMP, CARB2, XCRBAM  No results found for: LITHM   RADIOLOGY REPORTS:(reviewed/updated 11/4/2017)  No results found.        MEDICATIONS     ALL MEDICATIONS:   Current Facility-Administered Medications   Medication Dose Route Frequency    lithium carbonate SR (LITHOBID) tablet 300 mg  300 mg Oral DAILY    lithium carbonate SR (LITHOBID) tablet 600 mg  600 mg Oral QHS    [START ON 11/24/2017] haloperidol decanoate (HALDOL DECANOATE) 100 mg/mL injection 150 mg  150 mg IntraMUSCular Q28D    albuterol (PROVENTIL HFA, VENTOLIN HFA, PROAIR HFA) inhaler 2 Puff  2 Puff Inhalation Q4H PRN    atenolol (TENORMIN) tablet 25 mg  25 mg Oral DAILY    haloperidol (HALDOL) tablet 5 mg  5 mg Oral BID    benztropine (COGENTIN) tablet 2 mg  2 mg Oral BID PRN    benztropine (COGENTIN) injection 2 mg  2 mg IntraMUSCular BID PRN    LORazepam (ATIVAN) injection 2 mg  2 mg IntraMUSCular Q4H PRN    LORazepam (ATIVAN) tablet 1 mg  1 mg Oral Q4H PRN    zolpidem (AMBIEN) tablet 10 mg  10 mg Oral QHS PRN    acetaminophen (TYLENOL) tablet 650 mg  650 mg Oral Q4H PRN    magnesium hydroxide (MILK OF MAGNESIA) 400 mg/5 mL oral suspension 30 mL  30 mL Oral DAILY PRN    nicotine (NICODERM CQ) 21 mg/24 hr patch 1 Patch  1 Patch TransDERmal DAILY PRN    benztropine (COGENTIN) tablet 0.5 mg  0.5 mg Oral DAILY    haloperidol (HALDOL) tablet 5 mg  5 mg Oral Q6H PRN    haloperidol lactate (HALDOL) injection 5 mg  5 mg IntraMUSCular Q6H PRN      SCHEDULED MEDICATIONS:   Current Facility-Administered Medications   Medication Dose Route Frequency    lithium carbonate SR (LITHOBID) tablet 300 mg  300 mg Oral DAILY    lithium carbonate SR (LITHOBID) tablet 600 mg  600 mg Oral QHS    [START ON 11/24/2017] haloperidol decanoate (HALDOL DECANOATE) 100 mg/mL injection 150 mg  150 mg IntraMUSCular Q28D    atenolol (TENORMIN) tablet 25 mg  25 mg Oral DAILY    haloperidol (HALDOL) tablet 5 mg  5 mg Oral BID    benztropine (COGENTIN) tablet 0.5 mg  0.5 mg Oral DAILY          ASSESSMENT & PLAN     DIAGNOSES REQUIRING ACTIVE TREATMENT AND MONITORING: (reviewed/updated 11/4/2017)  Patient C/Bruna Verduz[facility] [address] Problem List:   Schizoaffective disorder, bipolar type ([facility] [address] (10/28/2017)    Assessment: slowly progressing- no rafaela and affect improving. denies hallucination    Plan: I will ct to adjust med- response to med- Lithium ,haldol po, consider maintenance Haldol Dec 150 mg next dose and dc Abilify Maintenna. Patient is on two antipsychotics now due to the relative refractoriness to treatment thus far. Prior to admission patient had THREE or more failed trails of monotherapy, including: Haldol, Abilify and Zyprexa. The patient is a very slow responder to medications. Risks and benefits in the use of two antipsychotics have been weighed in full, including the risk of metabolic syndrome and the potential increased risk of QTc  Based on this analysis, it is considered favorable for the utilization of two antipsychotics. In the future, once stable on the two antipsychotics, patient can possibly be tapered to one of the chosen antipsychotics.   Will check on EKG results today to monitor QTc.- 424       Alcohol abuse (10/28/2017)    Assessment: episodic per report, denies w/d sx    Plan: ct to monitor for w/d- BAL-ve   Dependence on nicotine from cigarettes (10/28/2017)    Assessment: chronic    Plan: nicotine patch      I will continue to monitor blood levels (, lithium--a drug with a narrow therapeutic index= NTI) and associated labs for drug therapy implemented that require intense monitoring for toxicity as deemed appropriate based on current medication side effects and pharmacodynamically determined drug 1/2 lives. - level due on Monday    In summary, Alejandra Schofield, is a 40 y.o.  female who presents with a severe exacerbation of the principal diagnosis of Schizoaffective disorder, bipolar type ([facility] [address]  Patient's condition is worsening/not improving/not stable . Patient requires continued inpatient hospitalization for further stabilization, safety monitoring and medication management. I will continue to coordinate the provision of individual, milieu, occupational, group, and substance abuse therapies to address target symptoms/diagnoses as deemed appropriate for the individual patient. A coordinated, multidisplinary treatment team round was conducted with the patient (this team consists of the nurse, psychiatric unit pharmcist,  and writer). Complete current electronic health record for patient has been reviewed today including consultant notes, ancillary staff notes, nurses and psychiatric tech notes. Suicide risk assessment completed and patient deemed to be of low risk for suicide at this time. The following regarding medications was addressed during rounds with patient:   the risks and benefits of the proposed medication. The patient was given the opportunity to ask questions. Informed consent given to the use of the above medications. Will continue to adjust psychiatric and non-psychiatric medications (see above \"medication\" section and orders section for details) as deemed appropriate & based upon diagnoses and response to treatment. I will continue to order blood tests/labs and diagnostic tests as deemed appropriate and review results as they become available (see orders for details and above listed lab/test results). I will order psychiatric records from previous [facility] hospitals to further elucidate the nature of patient's psychopathology and review once available.     I will gather additional collateral information from friends, family and o/p treatment team to further elucidate the nature of patient's psychopathology and baselline level of psychiatric functioning. I certify that this patient's inpatient psychiatric hospital services furnished since the previous certification were, and continue to be, required for treatment that could reasonably be expected to improve the patient's condition, or for diagnostic study, and that the patient continues to need, on a daily basis, active treatment furnished directly by or requiring the supervision of inpatient psychiatric facility personnel. In addition, the hospital records show that services furnished were intensive treatment services, admission or related services, or equivalent services.     EXPECTED DISCHARGE DATE/DAY: next Monday     DISPOSITION: Home       Signed By:   John Berg MD  11/4/2017

## 2017-11-05 NOTE — BH NOTES
The patient Estela Ortiz is participating in Relaxation Group. Group time: 30 minutes    Personal goal for participation: personal  Goal orientation:  To learn to relax    Group therapy participation: Active    Therapeutic interventions reviewed and discussed: Yes    Ralph Ferraro  11/4/2017

## 2017-11-05 NOTE — PROGRESS NOTES
Problem: Manic Behavior (Adult/Pediatric)  Goal: *STG: Remains safe in hospital  Outcome: Progressing Towards Goal  Patient alert, pleasant and cooperative. Noted dressed with improved hygiene. Sitting mostly in the dayroom watching TV, easily engaged in conversation. Affect bright. Compliant with meds, accepting flds and snacks. Responds appropriately, presents as clear. No behavioral issues. Denies hallucinations and no self harm behaviors noted. Will continue 15min checks, assess thought processes and remain supportive.

## 2017-11-05 NOTE — BH NOTES
11p-7a Shift Note    Patient slept for a total of 7 hours this shift. NO complaints voiced and no acute distress noted. Q 15 minute checks maintained for safety.  Will continue to monitor for safety and reassess patients status as necessary

## 2017-11-06 VITALS
DIASTOLIC BLOOD PRESSURE: 89 MMHG | OXYGEN SATURATION: 96 % | HEIGHT: 65 IN | TEMPERATURE: 98 F | BODY MASS INDEX: 45.98 KG/M2 | HEART RATE: 68 BPM | RESPIRATION RATE: 16 BRPM | SYSTOLIC BLOOD PRESSURE: 134 MMHG | WEIGHT: 276 LBS

## 2017-11-06 LAB
ALBUMIN SERPL-MCNC: 3.2 G/DL (ref 3.5–5)
ALBUMIN/GLOB SERPL: 0.8 {RATIO} (ref 1.1–2.2)
ALP SERPL-CCNC: 71 U/L (ref 45–117)
ALT SERPL-CCNC: 34 U/L (ref 12–78)
AST SERPL-CCNC: 18 U/L (ref 15–37)
BILIRUB DIRECT SERPL-MCNC: 0.1 MG/DL (ref 0–0.2)
BILIRUB SERPL-MCNC: 0.3 MG/DL (ref 0.2–1)
CHOLEST SERPL-MCNC: 158 MG/DL
DATE LAST DOSE: ABNORMAL
EST. AVERAGE GLUCOSE BLD GHB EST-MCNC: 108 MG/DL
GLOBULIN SER CALC-MCNC: 4 G/DL (ref 2–4)
HBA1C MFR BLD: 5.4 % (ref 4.2–6.3)
HDLC SERPL-MCNC: 31 MG/DL
HDLC SERPL: 5.1 {RATIO} (ref 0–5)
LDLC SERPL CALC-MCNC: 87.6 MG/DL (ref 0–100)
LIPID PROFILE,FLP: ABNORMAL
LITHIUM SERPL-SCNC: 0.52 MMOL/L (ref 0.6–1.2)
PROT SERPL-MCNC: 7.2 G/DL (ref 6.4–8.2)
REPORTED DOSE,DOSE: ABNORMAL UNITS
REPORTED DOSE/TIME,TMG: ABNORMAL
TRIGL SERPL-MCNC: 197 MG/DL (ref ?–150)
TSH SERPL DL<=0.05 MIU/L-ACNC: 4.64 UIU/ML (ref 0.36–3.74)
VLDLC SERPL CALC-MCNC: 39.4 MG/DL

## 2017-11-06 PROCEDURE — 74011250637 HC RX REV CODE- 250/637: Performed by: INTERNAL MEDICINE

## 2017-11-06 PROCEDURE — 80076 HEPATIC FUNCTION PANEL: CPT | Performed by: PSYCHIATRY & NEUROLOGY

## 2017-11-06 PROCEDURE — 83036 HEMOGLOBIN GLYCOSYLATED A1C: CPT | Performed by: PSYCHIATRY & NEUROLOGY

## 2017-11-06 PROCEDURE — 80178 ASSAY OF LITHIUM: CPT | Performed by: PSYCHIATRY & NEUROLOGY

## 2017-11-06 PROCEDURE — 80061 LIPID PANEL: CPT | Performed by: PSYCHIATRY & NEUROLOGY

## 2017-11-06 PROCEDURE — 84443 ASSAY THYROID STIM HORMONE: CPT | Performed by: PSYCHIATRY & NEUROLOGY

## 2017-11-06 PROCEDURE — 36415 COLL VENOUS BLD VENIPUNCTURE: CPT | Performed by: PSYCHIATRY & NEUROLOGY

## 2017-11-06 PROCEDURE — 74011250637 HC RX REV CODE- 250/637: Performed by: PSYCHIATRY & NEUROLOGY

## 2017-11-06 RX ORDER — BENZTROPINE MESYLATE 0.5 MG/1
0.5 TABLET ORAL DAILY
Qty: 14 TAB | Refills: 0 | Status: SHIPPED | OUTPATIENT
Start: 2017-11-07 | End: 2017-11-21

## 2017-11-06 RX ORDER — LITHIUM CARBONATE 300 MG/1
300 TABLET, FILM COATED, EXTENDED RELEASE ORAL DAILY
Qty: 14 TAB | Refills: 0 | Status: SHIPPED | OUTPATIENT
Start: 2017-11-07 | End: 2017-11-21

## 2017-11-06 RX ORDER — ATENOLOL 25 MG/1
25 TABLET ORAL DAILY
Qty: 14 TAB | Refills: 0 | Status: SHIPPED | OUTPATIENT
Start: 2017-11-07 | End: 2017-11-21

## 2017-11-06 RX ORDER — HALOPERIDOL 5 MG/1
5 TABLET ORAL 2 TIMES DAILY
Qty: 28 TAB | Refills: 0 | Status: SHIPPED | OUTPATIENT
Start: 2017-11-06 | End: 2017-11-20

## 2017-11-06 RX ORDER — LITHIUM CARBONATE 300 MG/1
600 TABLET, FILM COATED, EXTENDED RELEASE ORAL
Qty: 28 TAB | Refills: 0 | Status: SHIPPED | OUTPATIENT
Start: 2017-11-06 | End: 2017-11-20

## 2017-11-06 RX ORDER — HALOPERIDOL DECANOATE 100 MG/ML
150 INJECTION INTRAMUSCULAR
Qty: 1.5 ML | Refills: 0 | Status: SHIPPED | OUTPATIENT
Start: 2017-11-24 | End: 2017-12-22

## 2017-11-06 RX ADMIN — BENZTROPINE MESYLATE 0.5 MG: 1 TABLET ORAL at 07:47

## 2017-11-06 RX ADMIN — LITHIUM CARBONATE 300 MG: 300 TABLET, FILM COATED, EXTENDED RELEASE ORAL at 07:47

## 2017-11-06 RX ADMIN — ATENOLOL 25 MG: 25 TABLET ORAL at 07:47

## 2017-11-06 RX ADMIN — HALOPERIDOL 5 MG: 5 TABLET ORAL at 07:47

## 2017-11-06 NOTE — DISCHARGE SUMMARY
PSYCHIATRIC DISCHARGE SUMMARY         IDENTIFICATION:    Patient Name  Ruel Peres   Date of Birth 1980   [facility] [id number]   Medical Record Number  [id number]      Age  40 y.o. PCP Karina Myers MD   Admit date:  10/28/2017    Discharge date: 11/6/2017   Room Number  325/02  @ [address]   Date of Service  11/6/2017            TYPE OF DISCHARGE: REGULAR               CONDITION AT DISCHARGE: improved       PROVISIONAL & DISCHARGE DIAGNOSES:    Problem List  Never Reviewed          Codes Class    * (Principal)Schizoaffective disorder, bipolar type ([facility] [address] ICD-10-CM: F25.0  ICD-9-CM: 295.70         Schizoaffective disorder ([facility] [address] ICD-10-CM: F25.9  ICD-9-CM: 295.70         Alcohol abuse ICD-10-CM: F10.10  ICD-9-CM: 305.00         Dependence on nicotine from cigarettes ICD-10-CM: F17.210  ICD-9-CM: 305.1               Active Hospital Problems    *Schizoaffective disorder, bipolar type ([facility] [address]      Alcohol abuse      Dependence on nicotine from cigarettes        DISCHARGE DIAGNOSIS:   Axis I:  SEE ABOVE  Axis II: SEE ABOVE  Axis III: SEE ABOVE  Axis IV:  Alcohol abuse,lack of structure  Axis V:  30 on admission, 70 on discharge        CC & HISTORY OF PRESENT ILLNESS:  The patient, Ruel Peres, is a 40 y.o. WHITE OR  female with a past psychiatric history significant for schizoaffective disorder, who presents at this time with complaints of (and/or evidence of) the following emotional symptoms: psychotic behavior. Additional symptomatology include impulsive, bizarre behavior ( tore up her room, painted her room with Ronaldo and red white and blue color). She is also reported to have been responding to internal stimuli- \"yelling at god\", alcohol abuse  and anger outbursts. On admission, pt is calm but appear guarded, paranoid delusional themes and with flat affect. Denies SI/HI. The above symptoms have been present for 1 week. These symptoms are of severe severity.  These symptoms are constant in nature. The patient's condition has been precipitated by and psychosocial stressors (alcohol abuse, non compliance ). Patient's condition made worse by  alcohol use as well as treatment noncompliance. UDS: negative; BAL=0.        SOCIAL HISTORY:    Social History     Social History    Marital status: SINGLE     Spouse name: N/A    Number of children: N/A    Years of education: N/A     Occupational History    Not on file. Social History Main Topics    Smoking status: Current Every Day Smoker    Smokeless tobacco: Never Used    Alcohol use Yes      Comment: binge drinking- has sig use in the past    Drug use: Yes     Special: Marijuana    Sexual activity: Yes     Other Topics Concern    Not on file     Social History Narrative    Broke up with her BF of 2 years. Single. Lives with her stepfather. Drunken in public offense-    No sexual abuse          FAMILY HISTORY:   No family history on file. HOSPITALIZATION COURSE:    Vibha Beasley was admitted to the inpatient psychiatric unit [facility] for acute psychiatric stabilization in regards to symptomatology as described in the HPI above. The differential diagnosis at time of admission included: bipolar dis vs. schizoaffective vs. Schizophrenia. While on the unit Vibha Beasley was involved in individual, group, occupational and milieu therapy. Psychiatric medications were adjusted during this hospitalization including Haldol and Lithium. Vibha Beasley demonstrated a slow, but progressive improvement in overall condition. Much of patient's depression appeared to be related to situational stressors, effects of drugs of abuse, and psychological factors. Please see individual progress notes for more specific details regarding patient's hospitalization course. At time of discharge, Vibha Beasley is without significant problems of depression psychosis  rafaela.  Patient free of suicidal and homicidal ideations (appears to be at very low risk of suicide or homicide) and reports many positive predictive factors in terms of not attempting suicide or homicide. Overall presentation at time of discharge is most consistent with the diagnosis of schizoaffective disorder-bipolar disorder. Patient with request for discharge today. There are no grounds to seek a TDO. Patient has maximized benefit to be derived from acute inpatient psychiatric treatment. All members of the treatment team concur with each other in regards to plans for discharge today per patient's request.  Patient and family are aware and in agreement with discharge and discharge plan. Per my last note:   Schizoaffective disorder, bipolar type ([facility] [address] (10/28/2017)    Assessment: slowly progressing- no rafaela and affect improving. denies hallucination    Plan: I will ct to adjust med- response to med- Lithium ,haldol po, consider maintenance Haldol Dec 150 mg next dose and dc Abilify Maintenna.      Patient is on two antipsychotics now due to the relative refractoriness to treatment thus far. Prior to admission patient had THREE or more failed trails of monotherapy, including: Haldol, Abilify and Zyprexa. The patient is a very slow responder to medications. Risks and benefits in the use of two antipsychotics have been weighed in full, including the risk of metabolic syndrome and the potential increased risk of QTc  Based on this analysis, it is considered favorable for the utilization of two antipsychotics. In the future, once stable on the two antipsychotics, patient can possibly be tapered to one of the chosen antipsychotics.   Will check on EKG results today to monitor QTc.- 424        Alcohol abuse (10/28/2017)    Assessment: episodic per report, denies w/d sx    Plan: ct to monitor for w/d- BAL-ve   Dependence on nicotine from cigarettes (10/28/2017)    Assessment: chronic    Plan: nicotine patch       LABS AND IMAGAING: Labs Reviewed   HEPATIC FUNCTION PANEL - Abnormal; Notable for the following:        Result Value    Albumin 3.2 (*)     A-G Ratio 0.8 (*)     All other components within normal limits   TSH 3RD GENERATION - Abnormal; Notable for the following:     TSH 4.64 (*)     All other components within normal limits   LIPID PANEL - Abnormal; Notable for the following:     Triglyceride 197 (*)     CHOL/HDL Ratio 5.1 (*)     All other components within normal limits   LITHIUM - Abnormal; Notable for the following:     Lithium level 0.52 (*)     All other components within normal limits   HEMOGLOBIN A1C WITH EAG     No results found for: DS35, PHEN, PHENO, PHENT, DILF, DS39, PHENY, PTN, VALF2, VALAC, VALP, VALPR, DS6, CRBAM, CRBAMP, CARB2, XCRBAM  Admission on 10/28/2017   Component Date Value Ref Range Status    Ventricular Rate 10/31/2017 72  BPM Final    Atrial Rate 10/31/2017 72  BPM Final    P-R Interval 10/31/2017 200  ms Final    QRS Duration 10/31/2017 82  ms Final    Q-T Interval 10/31/2017 388  ms Final    QTC Calculation (Bezet) 10/31/2017 424  ms Final    Calculated P Axis 10/31/2017 77  degrees Final    Calculated R Axis 10/31/2017 100  degrees Final    Calculated T Axis 10/31/2017 61  degrees Final    Diagnosis 10/31/2017    Final                    Value:Normal sinus rhythm  normal study  Confirmed by Claude Dimmer MD, Sanford Estrada ([id number]) on 11/1/2017 4:03:13 PM      Protein, total 11/06/2017 7.2  6.4 - 8.2 g/dL Final    Albumin 11/06/2017 3.2* 3.5 - 5.0 g/dL Final    Globulin 11/06/2017 4.0  2.0 - 4.0 g/dL Final    A-G Ratio 11/06/2017 0.8* 1.1 - 2.2   Final    Bilirubin, total 11/06/2017 0.3  0.2 - 1.0 MG/DL Final    Bilirubin, direct 11/06/2017 0.1  0.0 - 0.2 MG/DL Final    Alk.  phosphatase 11/06/2017 71  45 - 117 U/L Final    AST (SGOT) 11/06/2017 18  15 - 37 U/L Final    ALT (SGPT) 11/06/2017 34  12 - 78 U/L Final    Hemoglobin A1c 11/06/2017 5.4  4.2 - 6.3 % Final    Est. average glucose 11/06/2017 108  mg/dL Final    TSH 11/06/2017 4.64* 0.36 - 3.74 uIU/mL Final    LIPID PROFILE 11/06/2017        Final    Cholesterol, total 11/06/2017 158  <200 MG/DL Final    Triglyceride 11/06/2017 197* <150 MG/DL Final    HDL Cholesterol 11/06/2017 31  MG/DL Final    LDL, calculated 11/06/2017 87.6  0 - 100 MG/DL Final    VLDL, calculated 11/06/2017 39.4  MG/DL Final    CHOL/HDL Ratio 11/06/2017 5.1* 0 - 5.0   Final    Lithium level 11/06/2017 0.52* 0.60 - 1.20 MMOL/L Final    Reported dose date: 11/06/2017 NOT PROVIDED    Final    Reported dose time: 11/06/2017 NOT PROVIDED    Final    Reported dose: 11/06/2017 NOT PROVIDED  UNITS Final     No results found. DISPOSITION:    Home. Patient to f/u with psychiatric, and psychotherapy appointments. Patient is to f/u with internist as directed. Patient should have a Lithium level and associated labs checked within the next 1-2 weeks by patient's o/p psychiatrist/internist.               FOLLOW-UP CARE:    Activity as tolerated  Low fat, Low cholesterol  Wound Care: none needed. Follow-up Information     Follow up With Details Comments Contact Info    Haloperidol decanoate 150 mg IM injection  On 11/24/2017 Haloperidol decanoate 150 mg IM injection due on 11/24/17. Please discontinue Abilify Maintena injection. [facility]  psychiatric medication management  Address: [facility] [address], [place], [address] [facility]  Phone: [phone]      MD Niles Steward [facility] [address] [id number] [phone]                   PROGNOSIS:    Guarded / Poor---- based on nature of patient's pathology/ies and treatment compliance issues. Prognosis is greatly dependent upon patient's ability to follow up with psychiatric/psychotherapy appointments as well as to comply with psychiatric medications as prescribed.             DISCHARGE MEDICATIONS:     Informed consent given for the use of following psychotropic medications:  Current Discharge Medication List      START taking these medications    Details   atenolol (TENORMIN) 25 mg tablet Take 1 Tab by mouth daily for 14 days. Indications: hypertension  Qty: 14 Tab, Refills: 0      haloperidol (HALDOL) 5 mg tablet Take 1 Tab by mouth two (2) times a day for 14 days. Indications: Schizoaffective disorder  Qty: 28 Tab, Refills: 0      !! lithium carbonate SR (LITHOBID) 300 mg CR tablet Take 1 Tab by mouth daily for 14 days. Indications: Schizoaffective disorder, bipolar type  Qty: 14 Tab, Refills: 0      !! lithium carbonate SR (LITHOBID) 300 mg CR tablet Take 2 Tabs by mouth nightly for 14 days. Indications: Schizoaffective disorder, bipolar type  Qty: 28 Tab, Refills: 0       !! - Potential duplicate medications found. Please discuss with provider. CONTINUE these medications which have CHANGED    Details   benztropine (COGENTIN) 0.5 mg tablet Take 1 Tab by mouth daily for 14 days. Indications: extrapyramidal disease  Qty: 14 Tab, Refills: 0      haloperidol decanoate (HALDOL DECANOATE) 100 mg/mL injection 1.5 mL by IntraMUSCular route every twenty-eight (28) days for 28 days. Indications: Bipolar disorder  Qty: 1.5 mL, Refills: 0         STOP taking these medications       ARIPiprazole (ABILIFY MAINTENA) 400 mg injection Comments:   Reason for Stopping:         lisinopril (PRINIVIL, ZESTRIL) 20 mg tablet Comments:   Reason for Stopping:         hydroCHLOROthiazide (HYDRODIURIL) 25 mg tablet Comments:   Reason for Stopping:                      A coordinated, multidisplinary treatment team round was conducted with Nasreen Evangelista---this is done daily here at [facility]. This team consists of the nurse, psychiatric unit pharmcist,  and writer. I have spent greater than 35 minutes on discharge work.     Signed:  Leora Roque MD  11/6/2017

## 2017-11-06 NOTE — BH NOTES
GROUP THERAPY PROGRESS NOTE    The patient Ericka Candelaria is participating in Reflection Group. Group time: 30 minutes    Personal goal for participation:  To reflect on today's occurences    Goal orientation: Reflection    Group therapy participation: Active    Therapeutic interventions reviewed and discussed: Yes    Roro Pardo  11/5/2017

## 2017-11-06 NOTE — PROGRESS NOTES
Laboratory Monitoring for Lithium    This patient is currently prescribed the following medication(s):   Current Facility-Administered Medications   Medication Dose Route Frequency    lithium carbonate SR (LITHOBID) tablet 300 mg  300 mg Oral DAILY    lithium carbonate SR (LITHOBID) tablet 600 mg  600 mg Oral QHS    [START ON 11/24/2017] haloperidol decanoate (HALDOL DECANOATE) 100 mg/mL injection 150 mg  150 mg IntraMUSCular Q28D    atenolol (TENORMIN) tablet 25 mg  25 mg Oral DAILY    haloperidol (HALDOL) tablet 5 mg  5 mg Oral BID    benztropine (COGENTIN) tablet 0.5 mg  0.5 mg Oral DAILY       The following labs have been completed for monitoring of lithium:    Lithium Serum Concentration  Lab Results   Component Value Date/Time    Lithium level 0.52 11/06/2017 06:09 AM         Renal Function and Chemistry  No results found for: NA, K, CL, CO2, AGAP, BUN, CREA, BUCR    Assessment/Plan:  Lithium level is subtherapeutic, 0.52 mmol/L. Level was drawn appropriately and is at steady-state. Same dose continued.       Ramirez Sexton, PharmD, BCPS  [phone]

## 2017-11-06 NOTE — BH NOTES
PSYCHIATRIC PROGRESS NOTE         Patient Name  Erika Abreu   Date of Birth 1980   [facility] [id number]   Medical Record Number  [id number]      Age  40 y.o. PCP Brenda Lo MD   Admit date:  10/28/2017    Room Number  325/02  @ [facility]   Date of Service  11/5/2017          PSYCHOTHERAPY SESSION NOTE:  Length of psychotherapy session: 15 minutes    Main condition/diagnosis/issues treated during session today, 11/5/2017 : psychosis, agitation, medication, tx compliance    I employed Cognitive Behavioral therapy techniques, Reality-Oriented psychotherapy, as well as supportive psychotherapy in regards to various ongoing psychosocial stressors, including the following: pre-admission and current problems; housing issues; occupational issues; academic issues; medical issues; and stress of hospitalization. Interpersonal relationship issues and psychodynamic conflicts explored. Attempts made to alleviate maladaptive patterns. We, also, worked on issues of denial & effects of substance dependency/use     Overall, patient is slowly progressing    Treatment Plan Update (reviewed an updated 11/5/2017) : I will modify psychotherapy tx plan by implementing more stress management strategies, building upon cognitive behavioral techniques, increasing coping skills, as well as shoring up psychological defenses). An extended energy and skill set was needed to engage pt in psychotherapy due to some of the following: resistiveness, complexity, negativity, confrontational nature, hostile behaviors, and/or severe abnormalities in thought processes/psychosis resulting in the loss of expressive/receptive language communication skills. E & M PROGRESS NOTE:         HISTORY       CC:  \"okay\"  HISTORY OF PRESENT ILLNESS/INTERVAL HISTORY:  (reviewed/updated 11/5/2017). per initial evaluation: The patient, Erika Abreu, is a 40 y.o.   [place]ITE [place]  female with a past psychiatric history significant for schizoaffective disorder, who presents at this time with complaints of (and/or evidence of) the following emotional symptoms: psychotic behavior. Additional symptomatology include impulsive, bizarre behavior ( tore up her room, painted her room with Ronaldo and red white and blue color). She is also reported to have been responding to internal stimuli- \"yelling at god\", alcohol abuse  and anger outbursts. On admission, pt is calm but appear guarded, paranoid delusional themes and with flat affect. Denies SI/HI. The above symptoms have been present for 1 week. These symptoms are of severe severity. These symptoms are constant in nature. The patient's condition has been precipitated by and psychosocial stressors (alcohol abuse, non compliance ). Patient's condition made worse by  alcohol use as well as treatment noncompliance. UDS: negative; BAL=0. [employer] presents/reports/evidences the following emotional symptoms today, 11/5/2017:psychotic behavior. The above symptoms have been present for 1 week. These symptoms are of severe severity. The symptoms are constant in nature. Additional symptomatology and features include labile mood, guarded with paranoid delusional themes. Refusing med and labs but after discussion agreeable to take her med. 11/3--pleasant and co operative, less disorganized thought process and no agitation. affect is better,preoccupied with dc. denies SI/HI/AVH   11/4/17-Moved from Acute to the General side yesterday. Adjusting well. Compliant with meds and Tx. No prn's needed. Attending groups. 11/5/17-No behavioral or compliant issues. Slept 7 hrs. No prn's used. Looking forward to her discharge after the week-end. SIDE EFFECTS: (reviewed/updated 11/5/2017)  None reported or admitted to.   No noted toxicity with use of current med   ALLERGIES:(reviewed/updated 11/5/2017)  No Known Allergies   MEDICATIONS PRIOR TO ADMISSION:(reviewed/updated 11/5/2017)  Prescriptions Prior to Admission   Medication Sig    ARIPiprazole (ABILIFY MAINTENA) 400 mg injection 400 mg by IntraMUSCular route every thirty (30) days. Indications: BIPOLAR DISORDER IN REMISSION    haloperidol decanoate (HALDOL DECANOATE) 100 mg/mL injection 100 mg by IntraMUSCular route every twenty-eight (28) days. Indications: Bipolar disorder    lisinopril (PRINIVIL, ZESTRIL) 20 mg tablet Take 20 mg by mouth daily. Indications: hypertension    hydroCHLOROthiazide (HYDRODIURIL) 25 mg tablet Take 25 mg by mouth daily. Indications: hypertension    benztropine (COGENTIN) 0.5 mg tablet Take 0.5 mg by mouth two (2) times a day. Indications: extrapyramidal disease      PAST MEDICAL HISTORY: Past medical history from the initial psychiatric evaluation has been reviewed (reviewed/updated 11/5/2017) with no additional updates (I asked patient and no additional past medical history provided). Past Medical History:   Diagnosis Date    Hypertension    No past surgical history on file. SOCIAL HISTORY: Social history from the initial psychiatric evaluation has been reviewed (reviewed/updated 11/5/2017) with no additional updates (I asked patient and no additional social history provided). Social History     Social History    Marital status: SINGLE     Spouse name: N/A    Number of children: N/A    Years of education: N/A     Occupational History    Not on file. Social History Main Topics    Smoking status: Current Every Day Smoker    Smokeless tobacco: Never Used    Alcohol use Yes      Comment: binge drinking- has sig use in the past    Drug use: Yes     Special: Marijuana    Sexual activity: Yes     Other Topics Concern    Not on file     Social History Narrative    Broke up with her BF of 2 years. Single. Lives with her stepfather.     Drunken in public offense-    No sexual abuse          FAMILY HISTORY: Family history from the initial psychiatric evaluation has been reviewed (reviewed/updated 11/5/2017) with no additional updates (I asked patient and no additional family history provided). No family history on file. REVIEW OF SYSTEMS: (reviewed/updated 11/5/2017)  Appetite:good   Sleep: fitful   All other Review of Systems: Psychological ROS: positive for - behavioral disorder, concentration difficulties, irritability, mood swings and psychosis  Respiratory ROS: no cough, shortness of breath, or wheezing  Cardiovascular ROS: no chest pain or dyspnea on exertion  Neurological ROS: no TIA or stroke symptoms         [facility] (MSE):    MSE FINDINGS ARE WITHIN NORMAL LIMITS (WNL) UNLESS OTHERWISE STATED BELOW. ( ALL OF THE BELOW CATEGORIES OF THE MSE HAVE BEEN REVIEWED (reviewed 11/5/2017) AND UPDATED AS DEEMED APPROPRIATE )  General Presentation age appropriate and disheveled, unreliable and vague   Orientation oriented to time, place and person   Vital Signs  See below (reviewed 11/5/2017); Vital Signs (BP, Pulse, & Temp) are within normal limits if not listed below.    Gait and Station Stable/steady, no ataxia   Musculoskeletal System No extrapyramidal symptoms (EPS); no abnormal muscular movements or Tardive Dyskinesia (TD); muscle strength and tone are within normal limits   Language No aphasia or dysarthria   Speech:  monotone   Thought Processes illogical; slow rate of thoughts; poor abstract reasoning/computation   Thought Associations Goal directed   Thought Content Less intense, free of AH   Suicidal Ideations none   Homicidal Ideations none   Mood:  Anxious, less labile   Affect:   stable, euthymic   Memory recent  impaired   Memory remote:  intact   Concentration/Attention:  distractable   Fund of Knowledge average   Insight:  limited   Reliability poor   Judgment:  limited          VITALS:     Patient Vitals for the past 24 hrs:   Pulse Resp BP   11/05/17 0509 74 18 125/78     Wt Readings from Last 3 Encounters:   10/28/17 125.2 kg (276 lb)     Temp Readings from Last 3 Encounters:   11/04/17 98 °F (36.7 °C)     BP Readings from Last 3 Encounters:   11/05/17 125/78     Pulse Readings from Last 3 Encounters:   11/05/17 74            DATA     LABORATORY DATA:(reviewed/updated 11/5/2017)  No results found for this or any previous visit (from the past 24 hour(s)). No results found for: VALF2, VALAC, VALP, VALPR, DS6, CRBAM, CRBAMP, CARB2, XCRBAM  No results found for: LITHM   RADIOLOGY REPORTS:(reviewed/updated 11/5/2017)  No results found.        MEDICATIONS     ALL MEDICATIONS:   Current Facility-Administered Medications   Medication Dose Route Frequency    lithium carbonate SR (LITHOBID) tablet 300 mg  300 mg Oral DAILY    lithium carbonate SR (LITHOBID) tablet 600 mg  600 mg Oral QHS    [START ON 11/24/2017] haloperidol decanoate (HALDOL DECANOATE) 100 mg/mL injection 150 mg  150 mg IntraMUSCular Q28D    albuterol (PROVENTIL HFA, VENTOLIN HFA, PROAIR HFA) inhaler 2 Puff  2 Puff Inhalation Q4H PRN    atenolol (TENORMIN) tablet 25 mg  25 mg Oral DAILY    haloperidol (HALDOL) tablet 5 mg  5 mg Oral BID    benztropine (COGENTIN) tablet 2 mg  2 mg Oral BID PRN    benztropine (COGENTIN) injection 2 mg  2 mg IntraMUSCular BID PRN    LORazepam (ATIVAN) injection 2 mg  2 mg IntraMUSCular Q4H PRN    LORazepam (ATIVAN) tablet 1 mg  1 mg Oral Q4H PRN    zolpidem (AMBIEN) tablet 10 mg  10 mg Oral QHS PRN    acetaminophen (TYLENOL) tablet 650 mg  650 mg Oral Q4H PRN    magnesium hydroxide (MILK OF MAGNESIA) 400 mg/5 mL oral suspension 30 mL  30 mL Oral DAILY PRN    nicotine (NICODERM CQ) 21 mg/24 hr patch 1 Patch  1 Patch TransDERmal DAILY PRN    benztropine (COGENTIN) tablet 0.5 mg  0.5 mg Oral DAILY    haloperidol (HALDOL) tablet 5 mg  5 mg Oral Q6H PRN    haloperidol lactate (HALDOL) injection 5 mg  5 mg IntraMUSCular Q6H PRN      SCHEDULED MEDICATIONS:   Current Facility-Administered Medications Medication Dose Route Frequency    lithium carbonate SR (LITHOBID) tablet 300 mg  300 mg Oral DAILY    lithium carbonate SR (LITHOBID) tablet 600 mg  600 mg Oral QHS    [START ON 11/24/2017] haloperidol decanoate (HALDOL DECANOATE) 100 mg/mL injection 150 mg  150 mg IntraMUSCular Q28D    atenolol (TENORMIN) tablet 25 mg  25 mg Oral DAILY    haloperidol (HALDOL) tablet 5 mg  5 mg Oral BID    benztropine (COGENTIN) tablet 0.5 mg  0.5 mg Oral DAILY          ASSESSMENT & PLAN     DIAGNOSES REQUIRING ACTIVE TREATMENT AND MONITORING: (reviewed/updated 11/5/2017)  Patient C[facility]runa [facility] [address] Problem List:   Schizoaffective disorder, bipolar type ([facility] [address] (10/28/2017)    Assessment: slowly progressing- no rafaela and affect improving. denies hallucination    Plan: I will ct to adjust med- response to med- Lithium ,haldol po, consider maintenance Haldol Dec 150 mg next dose and dc Abilify Maintenna. Patient is on two antipsychotics now due to the relative refractoriness to treatment thus far. Prior to admission patient had THREE or more failed trails of monotherapy, including: Haldol, Abilify and Zyprexa. The patient is a very slow responder to medications. Risks and benefits in the use of two antipsychotics have been weighed in full, including the risk of metabolic syndrome and the potential increased risk of QTc  Based on this analysis, it is considered favorable for the utilization of two antipsychotics. In the future, once stable on the two antipsychotics, patient can possibly be tapered to one of the chosen antipsychotics.   Will check on EKG results today to monitor QTc.- 424       Alcohol abuse (10/28/2017)    Assessment: episodic per report, denies w/d sx    Plan: ct to monitor for w/d- BAL-ve   Dependence on nicotine from cigarettes (10/28/2017)    Assessment: chronic    Plan: nicotine patch      I will continue to monitor blood levels (, lithium--a drug with a narrow therapeutic index= NTI) and associated labs for drug therapy implemented that require intense monitoring for toxicity as deemed appropriate based on current medication side effects and pharmacodynamically determined drug 1/2 lives. - level due on Monday    In summary, Chelsi Wang, is a 40 y.o.  female who presents with a severe exacerbation of the principal diagnosis of Schizoaffective disorder, bipolar type ([facility] [address]  Patient's condition is worsening/not improving/not stable . Patient requires continued inpatient hospitalization for further stabilization, safety monitoring and medication management. I will continue to coordinate the provision of individual, milieu, occupational, group, and substance abuse therapies to address target symptoms/diagnoses as deemed appropriate for the individual patient. A coordinated, multidisplinary treatment team round was conducted with the patient (this team consists of the nurse, psychiatric unit pharmcist,  and writer). Complete current electronic health record for patient has been reviewed today including consultant notes, ancillary staff notes, nurses and psychiatric tech notes. Suicide risk assessment completed and patient deemed to be of low risk for suicide at this time. The following regarding medications was addressed during rounds with patient:   the risks and benefits of the proposed medication. The patient was given the opportunity to ask questions. Informed consent given to the use of the above medications. Will continue to adjust psychiatric and non-psychiatric medications (see above \"medication\" section and orders section for details) as deemed appropriate & based upon diagnoses and response to treatment. I will continue to order blood tests/labs and diagnostic tests as deemed appropriate and review results as they become available (see orders for details and above listed lab/test results).     I will order psychiatric records from previous [facility] hospitals to further elucidate the nature of patient's psychopathology and review once available. I will gather additional collateral information from friends, family and o/p treatment team to further elucidate the nature of patient's psychopathology and baselline level of psychiatric functioning. I certify that this patient's inpatient psychiatric hospital services furnished since the previous certification were, and continue to be, required for treatment that could reasonably be expected to improve the patient's condition, or for diagnostic study, and that the patient continues to need, on a daily basis, active treatment furnished directly by or requiring the supervision of inpatient psychiatric facility personnel. In addition, the hospital records show that services furnished were intensive treatment services, admission or related services, or equivalent services.     EXPECTED DISCHARGE DATE/DAY: next Monday     DISPOSITION: Home       Signed By:   Yisel Paul MD  11/5/2017

## 2017-11-06 NOTE — PROGRESS NOTES
Problem: Manic Behavior (Adult/Pediatric)  Goal: *STG: Participates in treatment plan  Outcome: Progressing Towards Goal  3p-11p Shift Note    Patient had an uneventful shift. She has been medication and meal compliant and participated in group this evening. She is pleasant on approach. No complaints voiced and no acute distress noted. Q 15 minute checks maintained for safety.  Will continue to monitor for safety and reassess patients status as necessary

## 2017-11-06 NOTE — BH NOTES
[facility]  [phone]  # [phone]     Called at 11:30AM.

## 2017-11-06 NOTE — BH NOTES
Behavioral Health Transition Record to Provider    Patient Name: Erika Patel  YOB: 1980  Medical Record Number: [id number]  Date of Admission: 10/28/2017  Date of Discharge: 11/6/2017    Attending Provider: So Boogie MD  Discharging Provider: So Boogie MD  To contact this individual call [phone] and ask the  to page. If unavailable, ask to be transferred to [facility] Provider on call. [facility] Provider will be available on call 24/7 and during holidays     Primary Care Provider: Gus Chen MD    No Known Allergies       H&P Summary Notes      H&P by Sheri Cortez MD at 10/28/17 2025     Author:  Sheri Cortez MD Service:  Internal Medicine Author Type:  Physician    Filed:  10/29/17 1013 Date of Service:  10/28/17 2025 Status:  Signed    :  Sheri Cortez MD (Physician)           History and Physical    Subjective:     Erika Patel is a 40 y. o.[SA1.1] female with past medical hx significant for HTN, Asthma, and obesity. Pt is hospitalized with principal diagnosis of schizoaffective disorder. Pt is stable with regards to asthma not requiring any treatment. Pt is not copmplaining of any other acute medical issues. Pt is showing no signs of acute distress.[SA1.2]     Past Medical History:   Diagnosis Date    Hypertension[SA1.1]      > Asthma   > Obesity[SA1.3]     PSHx:non declared by pt. Fhx: cancer[SA1.2]    Social History   Substance Use Topics    Smoking status: Current Every Day Smoker    Smokeless tobacco: Never Used    Alcohol use Yes      Comment: binge drinking- has sig use in the past       Prior to Admission medications    Medication Sig Start Date End Date Taking? Authorizing Provider   ARIPiprazole (ABILIFY) 9.75 mg/1.3 mL injection by IntraMUSCular route daily. Yes Historical Provider   white petrolatum-mineral oil (REFRESH LACRI-LUBE) 56.8-42.5 % ointment Administer  to both eyes every twelve (12) hours. Yes Historical Provider   HALOPERIDOL DECANOATE IM by IntraMUSCular route. Yes Historical Provider   HYPROMELLOSE (TEARISOL OP) Apply  to eye. Yes Historical Provider   hydroCHLOROthiazide (HYDRODIURIL) 25 mg tablet Take 25 mg by mouth daily. Yes Historical Provider   divalproex DR (DEPAKOTE) 500 mg tablet Take 1,000 mg by mouth nightly. Yes Historical Provider   multivitamin (ONE A DAY) tablet Take 1 Tab by mouth daily. Yes Historical Provider   albuterol (PROVENTIL HFA, VENTOLIN HFA, PROAIR HFA) 90 mcg/actuation inhaler Take  by inhalation. Yes Historical Provider   benztropine (COGENTIN) 0.5 mg tablet Take 0.5 mg by mouth daily. Yes Historical Provider     No Known Allergies     Review of Systems:[SA1.1]  Constitutional: negative  Eyes: negative  Ears, Nose, Mouth, Throat, and Face: negative  Respiratory: negative  Cardiovascular: negative  Gastrointestinal: negative  Genitourinary:negative  Integument/Breast: negative  Hematologic/Lymphatic: negative  Musculoskeletal:negative  Neurological: negative  Behavioral/Psychiatric:[SA1.2] schizoaffective disorder[SA1.3]  Endocrine: negative  Allergic/Immunologic: negative[SA1.2]     Objective: Intake and Output:            Physical Exam:   Visit Vitals    /87 (BP 1 Location: Right arm, BP Patient Position: During activity)    Pulse 77    Temp 97 °F (36.1 °C)    Resp 16    Ht 5' 5\" (1.651 m)    Wt 125.2 kg (276 lb)    SpO2 96%    Breastfeeding No    BMI 45.93 kg/m2     General:  Alert, cooperative, no distress, appears stated age. Head:  Normocephalic, without obvious abnormality, atraumatic. Eyes:  Conjunctivae/corneas clear. PERRL, EOMs intact. Ears:  Normal external ear canals both ears. Nose: Nares normal. Septum midline. Mucosa normal. No drainage or sinus tenderness.    Throat: Lips, mucosa, and tongue normal. Teeth and gums normal.   Neck: Supple, symmetrical, trachea midline, no adenopathy, thyroid: no enlargement/tenderness/nodules, no carotid bruit and no JVD. Back:   Symmetric, no curvature. ROM normal. No CVA tenderness. Lungs:   Clear to auscultation bilaterally. Chest wall:  No tenderness or deformity. Heart:  Regular rate and rhythm, S1, S2 normal, no murmur, click, rub or gallop. Abdomen:   Soft, non-tender. Bowel sounds normal. No masses,  No organomegaly. Extremities: Extremities normal, atraumatic, no cyanosis or edema. Pulses: 2+ and symmetric all extremities. Skin: Skin color, texture, turgor normal. No rashes or lesions   Lymph nodes: Cervical, supraclavicular, and axillary nodes normal.   Neurologic: CNII-XII intact. Normal strength, sensation and reflexes throughout. Data Review:   No results found for this or any previous visit (from the past 24 hour(s)). Assessment:     Principal Problem:    Schizoaffective disorder, bipolar type ([facility] [address] (10/28/2017)    Active Problems:    Alcohol abuse (10/28/2017)      Dependence on nicotine from cigarettes (10/28/2017)[SA1.1]    Obesity    HTN    Asthma[SA1.2]    Plan:[SA1.1]     Add Hctz 12.5mg po every day. Restart Proventil inhaler[SA1.3]    No VTE prophylaxis indicated or necessary at this time.    Signed By: Natalee Barajas MD     October 28, 2017[SA1.1]          Revision History       User Key Date/Time User Provider Type Action    > SA1.3 10/29/17 Halle Osorio MD Physician Sign     SA1.2 10/29/17 Halle Osorio MD Physician      SA1.1 10/28/17 2025 Natalee Barajas MD Physician             H&P by Abimbola Cardenas MD at 10/28/17 1141     Author:  Abimbola Cardenas MD Service:  PSYCHIATRY Author Type:  Physician    Filed:  10/28/17 1152 Date of Service:  10/28/17 1141 Status:  Signed    :  Abimbola Cardenas MD (Physician)             INITIAL PSYCHIATRIC EVALUATION            IDENTIFICATION:    Patient Name[PK1.1]  Brittani Evangelista[PK1.2]   Date of Birth[PK1.1] 1980[PK1.2]   CSN[PK1.1] [id number][PK1.2] Medical Record Number[PK1.1]  [id number][DK2.4]      Age[PK1.1]  40 y. o.[PK1.2]   PCP[PK1.1] Lesli Lynch MD[PK1.2]   Admit date:[PK1.1]  10/28/2017[PK1.2]    Room Number[PK1.1]  317/01[PK1.2]  @[PK1.1] [facility][PK1.2]   Date of Service[PK1.1]  10/28/2017[PK1.2]            HISTORY         REASON FOR HOSPITALIZATION:  CC: \"feeling okay, I painted my room with Ronaldo name\". Pt admitted under a temporary half-way order (TDO) for severe psychosis and rafaela proving to be an imminent danger to self and others and an inability to care for self. HISTORY OF PRESENT ILLNESS:    The patient,[PK1.1] Leighton Evangelista[PK1.2], is a[PK1.1] 40 y.o. WHITE OR  female[PK1.2] with a past psychiatric history significant for schizoaffective disorder, who presents at this time with complaints of (and/or evidence of) the following emotional symptoms: psychotic behavior. Additional symptomatology include impulsive, bizarre behavior ( tore up her room, painted her room with Ronaldo and red white and blue color). She is also reported to have been responding to internal stimuli- \"yelling at god\", alcohol abuse  and anger outbursts. On admission, pt is calm but appear guarded, paranoid delusional themes and with flat affect. Denies SI/HI. The above symptoms have been present for 1 week. These symptoms are of severe severity. These symptoms are constant in nature. The patient's condition has been precipitated by and psychosocial stressors (alcohol abuse, non compliance ). Patient's condition made worse by  alcohol use as well as treatment noncompliance. UDS: negative; BAL=0. ALLERGIES:[PK1.1] No Known Allergies[PK1.2]   MEDICATIONS PRIOR TO ADMISSION:[PK1.1]   Prescriptions Prior to Admission   Medication Sig    ARIPiprazole (ABILIFY) 9.75 mg/1.3 mL injection by IntraMUSCular route daily.     white petrolatum-mineral oil (REFRESH LACRI-LUBE) 56.8-42.5 % ointment Administer  to both eyes every twelve (12) hours.  HALOPERIDOL DECANOATE IM by IntraMUSCular route.  HYPROMELLOSE (TEARISOL OP) Apply  to eye.  hydroCHLOROthiazide (HYDRODIURIL) 25 mg tablet Take 25 mg by mouth daily.  divalproex DR (DEPAKOTE) 500 mg tablet Take 1,000 mg by mouth nightly.  multivitamin (ONE A DAY) tablet Take 1 Tab by mouth daily.  albuterol (PROVENTIL HFA, VENTOLIN HFA, PROAIR HFA) 90 mcg/actuation inhaler Take  by inhalation.  benztropine (COGENTIN) 0.5 mg tablet Take 0.5 mg by mouth daily.[PK1.2]      PAST MEDICAL HISTORY:[PK1.1]   Past Medical History:   Diagnosis Date    Hypertension    No past surgical history on file.[PK1.2]   SOCIAL HISTORY:[PK1.1]    Social History     Social History    Marital status: SINGLE     Spouse name: N/A    Number of children: N/A    Years of education: N/A     Occupational History    Not on file. Social History Main Topics    Smoking status: Current Every Day Smoker    Smokeless tobacco: Never Used    Alcohol use Yes      Comment: binge drinking- has sig use in the past    Drug use: Yes     Special: Marijuana    Sexual activity: Yes     Other Topics Concern    Not on file     Social History Narrative    Broke up with her BF of 2 years. Single. Lives with her stepfather. Drunken in public offense-    No sexual abuse[PK1.2]          FAMILY HISTORY: History reviewed. No pertinent family history.[PK1.1]   No family history on file.[PK1.2]    REVIEW OF SYSTEMS:   Psychological ROS: positive for - behavioral disorder, hallucinations, irritability and delusion  Pertinent items are noted in the History of Present Illness. All other Systems reviewed and are considered negative.            MENTAL STATUS EXAM & VITALS     MENTAL STATUS EXAM (MSE):    MSE FINDINGS ARE WITHIN NORMAL LIMITS (WNL) UNLESS OTHERWISE STATED BELOW. ( ALL OF THE BELOW CATEGORIES OF THE MSE HAVE BEEN REVIEWED (reviewed[PK1.1] 10/28/2017[PK1.2]) AND UPDATED AS DEEMED APPROPRIATE )  General Presentation age appropriate and casually dressed, guarded and unreliable   Orientation disorganized   Vital Signs  See below (reviewed[PK1.1] 10/28/2017[PK1.2]); Vital Signs (BP, Pulse, & Temp) are within normal limits if not listed below. Gait and Station Stable/steady, no ataxia   Musculoskeletal System No extrapyramidal symptoms (EPS); no abnormal muscular movements or Tardive Dyskinesia (TD); muscle strength and tone are within normal limits   Language No aphasia or dysarthria   Speech:  hypoverbal and monotone   Thought Processes illogical; slow rate of thoughts; poor abstract reasoning/computation   Thought Associations tangential   Thought Content paranoid delusions, bizarre delusions, auditory hallucinations and internally preoccupied   Suicidal Ideations none and contracts for safety   Homicidal Ideations none   Mood:  anxious  and anhedonia   Affect:  anxious, inappropriate, increased in intensity and mood-incongruent   Memory recent  impaired   Memory remote:  intact   Concentration/Attention:  distractable and hypervigilance   Fund of Knowledge average   Insight:  poor   Reliability poor   Judgment:  limited          VITALS:[PK1.1]     Patient Vitals for the past 24 hrs:   Temp Pulse Resp BP SpO2   10/28/17 1147 97 °F (36.1 °C) - 16 133/87 -   10/28/17 0545 95.1 °F (35.1 °C) 77 18 115/82 96 %     Wt Readings from Last 3 Encounters:   10/28/17 125.2 kg (276 lb)     Temp Readings from Last 3 Encounters:   10/28/17 97 °F (36.1 °C)     BP Readings from Last 3 Encounters:   10/28/17 133/87     Pulse Readings from Last 3 Encounters:   10/28/17 77[PK1.2]            DATA     LABORATORY DATA:[PK1.1]  Labs Reviewed - No data to display  No results found for any previous visit.[PK1.2]     RADIOLOGY REPORTS:[PK1.1]  No results found for this or any previous visit.[PK1. 2]No results found.            MEDICATIONS       ALL MEDICATIONS[PK1.1]  Current Facility-Administered Medications   Medication Dose Route Frequency  benztropine (COGENTIN) tablet 2 mg  2 mg Oral BID PRN    benztropine (COGENTIN) injection 2 mg  2 mg IntraMUSCular BID PRN    LORazepam (ATIVAN) injection 2 mg  2 mg IntraMUSCular Q4H PRN    LORazepam (ATIVAN) tablet 1 mg  1 mg Oral Q4H PRN    zolpidem (AMBIEN) tablet 10 mg  10 mg Oral QHS PRN    acetaminophen (TYLENOL) tablet 650 mg  650 mg Oral Q4H PRN    ibuprofen (MOTRIN) tablet 400 mg  400 mg Oral Q8H PRN    magnesium hydroxide (MILK OF MAGNESIA) 400 mg/5 mL oral suspension 30 mL  30 mL Oral DAILY PRN    nicotine (NICODERM CQ) 21 mg/24 hr patch 1 Patch  1 Patch TransDERmal DAILY PRN    albuterol (PROVENTIL HFA, VENTOLIN HFA, PROAIR HFA) inhaler 2 Puff  2 Puff Inhalation Q4H RT    [START ON 10/29/2017] benztropine (COGENTIN) tablet 0.5 mg  0.5 mg Oral DAILY    haloperidol (HALDOL) tablet 5 mg  5 mg Oral Q6H PRN    haloperidol lactate (HALDOL) injection 5 mg  5 mg IntraMUSCular Q6H PRN    lithium carbonate SR (LITHOBID) tablet 300 mg  300 mg Oral BID[PK1.2]      SCHEDULED MEDICATIONS[PK1.1]  Current Facility-Administered Medications   Medication Dose Route Frequency    albuterol (PROVENTIL HFA, VENTOLIN HFA, PROAIR HFA) inhaler 2 Puff  2 Puff Inhalation Q4H RT    [START ON 10/29/2017] benztropine (COGENTIN) tablet 0.5 mg  0.5 mg Oral DAILY    lithium carbonate SR (LITHOBID) tablet 300 mg  300 mg Oral BID[PK1.2]                ASSESSMENT & PLAN        The patient,[PK1.1] Leighton Evangelista[PK1.2], is a[PK1.1] 40 y.o.  female[PK1.2] who presents at this time for treatment of the following diagnoses:  Patient Active Hospital Problem List:   Schizoaffective disorder, bipolar type ([facility] (10/28/2017)    Assessment: acute on chronic- psychosis with agitation- calm on admission- f/u with local [facility]- states she received Haldol dec yesterday,? Abilify depot and not taking her Depakote    Plan: I will ct to adjust med- need collateral info- ct with Haldol Dec and add PO, switch Depakote to Lithium   Alcohol abuse (10/28/2017)    Assessment: episodic per report, denies w/d sx    Plan: ct to monitor for w/d- BAL-ve   Dependence on nicotine from cigarettes (10/28/2017)    Assessment: chronic    Plan: nicotine patch          I will continue to monitor blood levels (Depakote, lithium, -a drug with a narrow therapeutic index= NTI) and associated labs for drug therapy implemented that require intense monitoring for toxicity as deemed appropriate based on current medication side effects and pharmacodynamically determined drug 1/2 lives. A coordinated, multidisplinary treatment team (includes the nurse, unit pharmcist,  and writer) round was conducted for this initial evaluation with the patient present. The following regarding medications was addressed during rounds with patient:   the risks and benefits of the proposed medication. The patient was given the opportunity to ask questions. Informed consent given to the use of the above medications. I will continue to adjust psychiatric and non-psychiatric medications (see above \"medication\" section and orders section for details) as deemed appropriate & based upon diagnoses and response to treatment. I have reviewed admission (and previous/old) labs and medical tests in the EHR and or transferring hospital documents. I will continue to order blood tests/labs and diagnostic tests as deemed appropriate and review results as they become available (see orders for details). I have reviewed old psychiatric and medical records available in the EHR. I Will order additional psychiatric records from other institutions to further elucidate the nature of patient's psychopathology and review once available. I will gather additional collateral information from friends, family and o/p treatment team to further elucidate the nature of patient's psychopathology and baselline level of psychiatric functioning.       ESTIMATED LENGTH OF STAY:    TBD STRENGTHS:  Access to housing/residential stability and Interpersonal/supportive relationships (family, friends, peers)                                        Pastor Adolph MD  10/28/2017[PK1.2]       Revision History       User Key Date/Time User Provider Type Action    > PK1.2 10/28/17 1152 Nela Bailon MD Physician Sign     PK1.1 10/28/17 1141 Nela Bailon MD Physician               Admission Diagnosis: schizoaffective d/o bipolar type  Schizoaffective disorder, bipolar type ([facility] [address]  Schizoaffective disorder ([facility] [address]    * No surgery found *    Results for orders placed or performed during the hospital encounter of 10/28/17   HEPATIC FUNCTION PANEL   Result Value Ref Range    Protein, total 7.2 6.4 - 8.2 g/dL    Albumin 3.2 (L) 3.5 - 5.0 g/dL    Globulin 4.0 2.0 - 4.0 g/dL    A-G Ratio 0.8 (L) 1.1 - 2.2      Bilirubin, total 0.3 0.2 - 1.0 MG/DL    Bilirubin, direct 0.1 0.0 - 0.2 MG/DL    Alk.  phosphatase 71 45 - 117 U/L    AST (SGOT) 18 15 - 37 U/L    ALT (SGPT) 34 12 - 78 U/L   HEMOGLOBIN A1C WITH EAG   Result Value Ref Range    Hemoglobin A1c 5.4 4.2 - 6.3 %    Est. average glucose 108 mg/dL   TSH 3RD GENERATION   Result Value Ref Range    TSH 4.64 (H) 0.36 - 3.74 uIU/mL   LIPID PANEL   Result Value Ref Range    LIPID PROFILE          Cholesterol, total 158 <200 MG/DL    Triglyceride 197 (H) <150 MG/DL    HDL Cholesterol 31 MG/DL    LDL, calculated 87.6 0 - 100 MG/DL    VLDL, calculated 39.4 MG/DL    CHOL/HDL Ratio 5.1 (H) 0 - 5.0     LITHIUM   Result Value Ref Range    Lithium level 0.52 (L) 0.60 - 1.20 MMOL/L    Reported dose date: NOT PROVIDED      Reported dose time: NOT PROVIDED      Reported dose: NOT PROVIDED UNITS   EKG, 12 LEAD, INITIAL   Result Value Ref Range    Ventricular Rate 72 BPM    Atrial Rate 72 BPM    P-R Interval 200 ms    QRS Duration 82 ms    Q-T Interval 388 ms    QTC Calculation (Bezet) 424 ms    Calculated P Axis 77 degrees    Calculated R Axis 100 degrees Calculated T Axis 61 degrees    Diagnosis       Normal sinus rhythm  normal study  Confirmed by Nedra Rose MD, Nakia Enriquez ([id number]) on 11/1/2017 4:03:13 PM         Immunizations administered during this encounter: There is no immunization history on file for this patient. Screening for Metabolic Disorders for Patients on Antipsychotic Medications  (Data obtained from the EMR)    Estimated Body Mass Index  Estimated body mass index is 45.93 kg/(m^2) as calculated from the following:    Height as of this encounter: 5' 5\" (1.651 m). Weight as of this encounter: 125.2 kg (276 lb). Vital Signs/Blood Pressure  Visit Vitals    /89    Pulse 68    Temp 98 °F (36.7 °C)    Resp 16    Ht 5' 5\" (1.651 m)    Wt 125.2 kg (276 lb)    SpO2 96%    Breastfeeding No    BMI 45.93 kg/m2       Blood Glucose/Hemoglobin A1c  No results found for: GLU, GLUCPOC     Lab Results   Component Value Date/Time    Hemoglobin A1c 5.4 11/06/2017 05:27 AM        Lipid Panel  Lab Results   Component Value Date/Time    Cholesterol, total 158 11/06/2017 06:08 AM    HDL Cholesterol 31 11/06/2017 06:08 AM    LDL, calculated 87.6 11/06/2017 06:08 AM    Triglyceride 197 11/06/2017 06:08 AM    CHOL/HDL Ratio 5.1 11/06/2017 06:08 AM       Discharge Diagnosis: Please refer to physician's discharge summary. Discharge Plan: Pt will be returning home to her step [facility]. Pt [facility] cab #[id number]    Discharge Medication List and Instructions:   Discharge Medication List as of 11/6/2017 12:30 PM      START taking these medications    Details   atenolol (TENORMIN) 25 mg tablet Take 1 Tab by mouth daily for 14 days. Indications: hypertension, Print, Disp-14 Tab, R-0      haloperidol (HALDOL) 5 mg tablet Take 1 Tab by mouth two (2) times a day for 14 days.  Indications: Schizoaffective disorder, Print, Disp-28 Tab, R-0      !! lithium carbonate SR (LITHOBID) 300 mg CR tablet Take 1 Tab by mouth daily for 14 days. Indications: Schizoaffective disorder, bipolar type, Print, Disp-14 Tab, R-0      !! lithium carbonate SR (LITHOBID) 300 mg CR tablet Take 2 Tabs by mouth nightly for 14 days. Indications: Schizoaffective disorder, bipolar type, Print, Disp-28 Tab, R-0       !! - Potential duplicate medications found. Please discuss with provider. CONTINUE these medications which have CHANGED    Details   benztropine (COGENTIN) 0.5 mg tablet Take 1 Tab by mouth daily for 14 days. Indications: extrapyramidal disease, Print, Disp-14 Tab, R-0      haloperidol decanoate (HALDOL DECANOATE) 100 mg/mL injection 1.5 mL by IntraMUSCular route every twenty-eight (28) days for 28 days. Indications: Bipolar disorder, Print, Disp-1.5 mL, R-0         STOP taking these medications       ARIPiprazole (ABILIFY MAINTENA) 400 mg injection Comments:   Reason for Stopping:         lisinopril (PRINIVIL, ZESTRIL) 20 mg tablet Comments:   Reason for Stopping:         hydroCHLOROthiazide (HYDRODIURIL) 25 mg tablet Comments:   Reason for Stopping:               Unresulted Labs     None        To obtain results of studies pending at discharge, please contact N/A    Follow-up Information     Follow up With Details Comments Contact Info    Haloperidol decanoate 150 mg IM injection  On 11/24/2017 Haloperidol decanoate 150 mg IM injection due on 11/24/17. Please discontinue Abilify Maintena injection.   Milagros Rivas Go on 11/14/2017 psychiatric medication management with Dr. Sherry He at 8:45 AM Address: [address], [place]  Phone: [phone]      MD [facility]  [id number]  [address] [place] [address] Go on 11/8/2017 Katheryn Okeefe will be coming to your home at 1:15 PM Address: [address] [employer] [address]  Phone: [phone]            Advanced Directive:   Does the patient have an appointed surrogate decision maker? Unknown   Does the patient have a Medical Advance Directive? Unknown   Does the patient have a Psychiatric Advance Directive? Unknown   If the patient does not have a surrogate or Medical Advance Directive AND Psychiatric Advance Directive, the patient was offered information on these advance directives Unknown       Patient Instructions: Please continue all medications until otherwise directed by physician. Tobacco Cessation Discharge Plan:   Is the patient a smoker and needs referral for smoking cessation? Yes  Patient referred to the following for smoking cessation with an appointment? Unknown   Patient was offered medication to assist with smoking cessation at discharge? Unknown   Was education for smoking cessation added to the discharge instructions? yes    Alcohol/Substance Abuse Discharge Plan:   Does the patient have a history of substance/alcohol abuse and requires a referral for treatment? no  Patient referred to the following for substance/alcohol abuse treatment with an appointment? no  Patient was offered medication to assist with alcohol cessation at discharge? no  Was education for substance/alcohol abuse added to discharge instructions? no      Patient discharged to Home; provided to the patient/caregiver either in hard copy or electronically. Pt is alert and oriented. Pt denies SI/HI. Pt is free of delusions. Pt's mood was euthymic. Pt's thought process was logical. Pt's insight and judgment are good. Pt is in agreement with the plan.

## 2017-11-06 NOTE — BH NOTES
Discharge instructions were given to the patient and she verbalized an understanding. Two staff verified the instructions. The patient had no home medications to be returned. All valuables and other belongings were returned upon discharge. Staff took patient off the unit.

## 2017-11-06 NOTE — BH NOTES
The patient Cynthia Bledsoe is participating in Relaxation Group. Group time: 30 minutes    Personal goal for participation: personal  Goal orientation:  To learn to relax    Group therapy participation: Active    Therapeutic interventions reviewed and discussed: Yes    Fernando Chairez  11/5/2017

## 2017-11-06 NOTE — BH NOTES
11p-7a  Shift Note    Patient rested for a total of 7 hours during the night. She expressed to this nurse feeling a little bit anxious this am that she would possibly be discharged. This nurse encouraged client to vent feelings and emotions and to make a list of goals that she would like to achieve. Patient verbalized understanding. Patient had labs drawn this am and tolerated this well. Q 15 minute checks maintained for safety. Will continue to monitor for safety and reassess patients status as necessary.

## 2017-11-06 NOTE — BH NOTES
The patient does attend the group and she is pleasant and cooperative. She is quiet and does not have a lot of interactions. She is medication and meal compliant. She is for discharge later today. Will continue to monitor q 15 minutes for safety and needs.

## 2017-11-06 NOTE — DISCHARGE INSTRUCTIONS
.Darrell Boxer - [address] [phone]  [address]- [phone]

## 2018-08-11 NOTE — IP AVS SNAPSHOT
[facility] 
 
 
 [address] Patient: Vibha Beasley MRN: [id number] [facility]:[id number] My Medications STOP taking these medications ABILIFY MAINTENA 400 mg injection Generic drug:  ARIPiprazole  
   
  
 hydroCHLOROthiazide 25 mg tablet Commonly known as:  HYDRODIURIL  
   
  
 lisinopril 20 mg tablet Commonly known as:  PRINIVIL, ZESTRIL  
   
  
  
TAKE these medications as instructed Instructions Each Dose to Equal  
 Morning Noon Evening Bedtime  
 atenolol 25 mg tablet Commonly known as:  TENORMIN Start taking on:  11/7/2017 Your last dose was: Your next dose is: Take 1 Tab by mouth daily for 14 days. Indications: hypertension 25 mg  
    
   
   
   
  
 benztropine 0.5 mg tablet Commonly known as:  COGENTIN Start taking on:  11/7/2017 Your last dose was: Your next dose is: Take 1 Tab by mouth daily for 14 days. Indications: extrapyramidal disease 0.5 mg  
    
   
   
   
  
 haloperidol 5 mg tablet Commonly known as:  HALDOL Your last dose was: Your next dose is: Take 1 Tab by mouth two (2) times a day for 14 days. Indications: Schizoaffective disorder 5 mg  
    
   
   
   
  
 haloperidol decanoate 100 mg/mL injection Commonly known as:  HALDOL DECANOATE Start taking on:  11/24/2017 Your last dose was: Your next dose is:    
   
   
 1.5 mL by IntraMUSCular route every twenty-eight (28) days for 28 days. Indications: Bipolar disorder 150 mg  
    
   
   
   
  
 * lithium carbonate  mg CR tablet Commonly known as:  Angel Enoch Your last dose was: Your next dose is: Take 2 Tabs by mouth nightly for 14 days. Indications: Schizoaffective disorder, bipolar type 600 mg  
    
   
   
   
  
 * lithium carbonate  mg CR tablet Commonly known as:  Pamela Carlson Start taking on:  11/7/2017 Your last dose was: Your next dose is: Take 1 Tab by mouth daily for 14 days. Indications: Schizoaffective disorder, bipolar type 300 mg * Notice: This list has 2 medication(s) that are the same as other medications prescribed for you. Read the directions carefully, and ask your doctor or other care provider to review them with you. Where to Get Your Medications Information on where to get these meds will be given to you by the nurse or doctor. ! Ask your nurse or doctor about these medications  
  atenolol 25 mg tablet  
 benztropine 0.5 mg tablet  
 haloperidol 5 mg tablet  
 haloperidol decanoate 100 mg/mL injection  
 lithium carbonate  mg CR tablet  
 lithium carbonate  mg CR tablet minimal